# Patient Record
Sex: FEMALE | Race: WHITE | Employment: UNEMPLOYED | URBAN - METROPOLITAN AREA
[De-identification: names, ages, dates, MRNs, and addresses within clinical notes are randomized per-mention and may not be internally consistent; named-entity substitution may affect disease eponyms.]

---

## 2017-07-25 ENCOUNTER — OFFICE VISIT (OUTPATIENT)
Dept: INTERNAL MEDICINE CLINIC | Facility: CLINIC | Age: 32
End: 2017-07-25

## 2017-07-25 VITALS
RESPIRATION RATE: 16 BRPM | WEIGHT: 150.81 LBS | HEIGHT: 65 IN | SYSTOLIC BLOOD PRESSURE: 112 MMHG | HEART RATE: 88 BPM | TEMPERATURE: 99 F | OXYGEN SATURATION: 98 % | DIASTOLIC BLOOD PRESSURE: 86 MMHG | BODY MASS INDEX: 25.13 KG/M2

## 2017-07-25 DIAGNOSIS — A87.9 VIRAL MENINGITIS: Primary | ICD-10-CM

## 2017-07-25 DIAGNOSIS — Z00.00 ROUTINE HEALTH MAINTENANCE: ICD-10-CM

## 2017-07-25 DIAGNOSIS — R53.83 OTHER FATIGUE: ICD-10-CM

## 2017-07-25 PROCEDURE — 99213 OFFICE O/P EST LOW 20 MIN: CPT | Performed by: INTERNAL MEDICINE

## 2017-07-25 PROCEDURE — 99212 OFFICE O/P EST SF 10 MIN: CPT | Performed by: INTERNAL MEDICINE

## 2017-07-25 NOTE — PROGRESS NOTES
Lisset Hussein is a 36year old female. Patient presents with: Follow - Up: Pt had meningitis about 5 weeks ago, gave birth three weeks ago; was instructed by gyne to follow-up with primary.     HPI:     Just moved back from [place];  got a job in [facility] Size: adult)   Pulse 88   Temp 99 °F (37.2 °C) (Oral)   Resp 16   Ht 5' 5\" (1.651 m)   Wt 150 lb 12.8 oz (68.4 kg)   SpO2 98%   Breastfeeding?  No   BMI 25.09 kg/m²   GENERAL: well developed, well nourished, in no apparent distress  NEURO: CN II-XII intact

## 2017-08-22 ENCOUNTER — OFFICE VISIT (OUTPATIENT)
Dept: INTERNAL MEDICINE CLINIC | Facility: CLINIC | Age: 32
End: 2017-08-22

## 2017-08-22 VITALS
HEART RATE: 94 BPM | TEMPERATURE: 99 F | HEIGHT: 64 IN | WEIGHT: 152.81 LBS | DIASTOLIC BLOOD PRESSURE: 90 MMHG | BODY MASS INDEX: 26.09 KG/M2 | SYSTOLIC BLOOD PRESSURE: 126 MMHG | OXYGEN SATURATION: 98 %

## 2017-08-22 DIAGNOSIS — Z00.00 ROUTINE HEALTH MAINTENANCE: Primary | ICD-10-CM

## 2017-08-22 DIAGNOSIS — J45.20 MILD INTERMITTENT ASTHMA WITHOUT COMPLICATION: ICD-10-CM

## 2017-08-22 DIAGNOSIS — F98.8 ATTENTION DEFICIT DISORDER, UNSPECIFIED HYPERACTIVITY PRESENCE: ICD-10-CM

## 2017-08-22 PROCEDURE — 99395 PREV VISIT EST AGE 18-39: CPT | Performed by: INTERNAL MEDICINE

## 2017-08-22 RX ORDER — ALBUTEROL SULFATE 90 UG/1
2 AEROSOL, METERED RESPIRATORY (INHALATION) EVERY 4 HOURS PRN
Qty: 1 INHALER | Refills: 6 | Status: SHIPPED | OUTPATIENT
Start: 2017-08-22 | End: 2019-03-27

## 2017-08-22 NOTE — PROGRESS NOTES
Idalia Umanzor is a 36year old female. Patient presents with:  Physical: Complains of trouble with her memory, wonders if it is \"pregnancy brain\"  Medication Request: Vyvanse refill.  Had been taking 40 mg while pregnant, normally takes 50 mg daily      HPI: reviewed. No pertinent family history. Social History:   Smoking status: Never Smoker                                                              Smokeless tobacco: Never Used                      Alcohol use:  Yes              Comment: once per month she try taking it at night -- if still making her groggy, will consider change to Effexor.       Ollie Dang MD  8/22/2017  2:29 PM

## 2017-11-06 ENCOUNTER — OFFICE VISIT (OUTPATIENT)
Dept: INTERNAL MEDICINE CLINIC | Facility: CLINIC | Age: 32
End: 2017-11-06

## 2017-11-06 VITALS
HEIGHT: 64 IN | DIASTOLIC BLOOD PRESSURE: 80 MMHG | HEART RATE: 104 BPM | BODY MASS INDEX: 25.95 KG/M2 | SYSTOLIC BLOOD PRESSURE: 120 MMHG | TEMPERATURE: 100 F | WEIGHT: 152 LBS

## 2017-11-06 DIAGNOSIS — J40 BRONCHITIS: Primary | ICD-10-CM

## 2017-11-06 PROCEDURE — 99212 OFFICE O/P EST SF 10 MIN: CPT | Performed by: INTERNAL MEDICINE

## 2017-11-06 PROCEDURE — 99213 OFFICE O/P EST LOW 20 MIN: CPT | Performed by: INTERNAL MEDICINE

## 2017-11-06 RX ORDER — AZITHROMYCIN 250 MG/1
TABLET, FILM COATED ORAL
COMMUNITY
Start: 2017-11-04 | End: 2018-04-16

## 2017-11-06 RX ORDER — PROMETHAZINE HYDROCHLORIDE AND CODEINE PHOSPHATE 6.25; 1 MG/5ML; MG/5ML
2.5 SYRUP ORAL EVERY 4 HOURS PRN
Qty: 180 ML | Refills: 0 | Status: SHIPPED | OUTPATIENT
Start: 2017-11-06 | End: 2018-04-16

## 2017-11-06 RX ORDER — PREDNISONE 10 MG/1
TABLET ORAL
Qty: 18 TABLET | Refills: 0 | Status: SHIPPED | OUTPATIENT
Start: 2017-11-06 | End: 2018-04-16

## 2017-11-06 NOTE — PROGRESS NOTES
Demetra Herrera is a 29year old female. Patient presents with:  Cough: Patient is here today with a bad cough for the past 2 weeks. Cough is mainly dry. Deep, barking cough. Complains of body aches, fatigue, and slight SOB after coughing fits.  Sinus congestion Lipid screening 2012   • Strep sore throat 02/10/95      Past Surgical History:  : ADENOIDECTOMY      Comment: dR. Aburto  2017:   2016:    History reviewed. No pertinent family history.    Social History:   Smoking status: Ne

## 2017-11-15 ENCOUNTER — TELEPHONE (OUTPATIENT)
Dept: INTERNAL MEDICINE CLINIC | Facility: CLINIC | Age: 32
End: 2017-11-15

## 2017-11-15 RX ORDER — AMOXICILLIN AND CLAVULANATE POTASSIUM 875; 125 MG/1; MG/1
1 TABLET, FILM COATED ORAL 2 TIMES DAILY
Qty: 20 TABLET | Refills: 0 | Status: SHIPPED | OUTPATIENT
Start: 2017-11-15 | End: 2017-11-25

## 2017-11-15 NOTE — TELEPHONE ENCOUNTER
Pt. Is requesting a refill on: vyvanse 50 mg  Ph. # [phone]   Pt.  Still has a cold & wants to see Dr. Nerissa Lowery    Routed to Rx

## 2017-11-15 NOTE — TELEPHONE ENCOUNTER
Pt reported that she was in last Monday to see Dr. Kelly for bronchitis. She reported that symptoms have not gone away. Noted that it felt like she was starting to get better and then got worse again.  She had been prescribed a z-pack prior to seeing Dr. Camila Lux

## 2018-04-16 ENCOUNTER — APPOINTMENT (OUTPATIENT)
Dept: LAB | Age: 33
End: 2018-04-16
Attending: INTERNAL MEDICINE
Payer: COMMERCIAL

## 2018-04-16 ENCOUNTER — OFFICE VISIT (OUTPATIENT)
Dept: INTERNAL MEDICINE CLINIC | Facility: CLINIC | Age: 33
End: 2018-04-16

## 2018-04-16 VITALS
TEMPERATURE: 99 F | HEART RATE: 72 BPM | HEIGHT: 64 IN | WEIGHT: 145.63 LBS | SYSTOLIC BLOOD PRESSURE: 120 MMHG | DIASTOLIC BLOOD PRESSURE: 70 MMHG | BODY MASS INDEX: 24.86 KG/M2

## 2018-04-16 DIAGNOSIS — R14.0 ABDOMINAL BLOATING: ICD-10-CM

## 2018-04-16 DIAGNOSIS — Z72.51 UNPROTECTED SEXUAL INTERCOURSE: ICD-10-CM

## 2018-04-16 DIAGNOSIS — A09 DIARRHEA OF INFECTIOUS ORIGIN: Primary | ICD-10-CM

## 2018-04-16 PROCEDURE — 99214 OFFICE O/P EST MOD 30 MIN: CPT | Performed by: INTERNAL MEDICINE

## 2018-04-16 PROCEDURE — 99212 OFFICE O/P EST SF 10 MIN: CPT | Performed by: INTERNAL MEDICINE

## 2018-04-16 NOTE — PROGRESS NOTES
Valentino Ona is a 29year old female.   HPI:   Patient presents with:  Abdominal Pain: had taken plan B weekend of 4/7-4/11 terrible diarrhea til 4/14, now abdomen is swollen , cramping, little bit of nausea , did plenty of pregnancy tests - all negative Dimesylate (VYVANSE) 50 MG Oral Cap Take 1 capsule (50 mg total) by mouth daily. Disp: 30 capsule Rfl: 0   Lisdexamfetamine Dimesylate (VYVANSE) 50 MG Oral Cap Take 1 capsule by mouth every morning.  Disp: 30 capsule Rfl: 0      Past Medical History:   Diag Will get CBC and CMP to rule out white count and electrolyte abnormalities. We talked about getting a stool GI panel but patient had a formed bowel movement and so if diarrhea does not return we will not pursue this.   Patient does have sickness so she may

## 2018-04-17 ENCOUNTER — TELEPHONE (OUTPATIENT)
Dept: INTERNAL MEDICINE CLINIC | Facility: CLINIC | Age: 33
End: 2018-04-17

## 2018-04-17 ENCOUNTER — APPOINTMENT (OUTPATIENT)
Dept: CT IMAGING | Facility: HOSPITAL | Age: 33
End: 2018-04-17
Attending: EMERGENCY MEDICINE
Payer: COMMERCIAL

## 2018-04-17 ENCOUNTER — HOSPITAL ENCOUNTER (EMERGENCY)
Facility: HOSPITAL | Age: 33
Discharge: HOME | End: 2018-04-17
Attending: EMERGENCY MEDICINE
Payer: COMMERCIAL

## 2018-04-17 VITALS
WEIGHT: 145 LBS | TEMPERATURE: 98 F | BODY MASS INDEX: 24.16 KG/M2 | OXYGEN SATURATION: 99 % | SYSTOLIC BLOOD PRESSURE: 120 MMHG | HEIGHT: 65 IN | HEART RATE: 87 BPM | DIASTOLIC BLOOD PRESSURE: 75 MMHG | RESPIRATION RATE: 18 BRPM

## 2018-04-17 DIAGNOSIS — K52.9 ENTERITIS: ICD-10-CM

## 2018-04-17 DIAGNOSIS — N30.90 CYSTITIS: Primary | ICD-10-CM

## 2018-04-17 PROCEDURE — 80076 HEPATIC FUNCTION PANEL: CPT | Performed by: EMERGENCY MEDICINE

## 2018-04-17 PROCEDURE — 81025 URINE PREGNANCY TEST: CPT

## 2018-04-17 PROCEDURE — 96375 TX/PRO/DX INJ NEW DRUG ADDON: CPT

## 2018-04-17 PROCEDURE — 96372 THER/PROPH/DIAG INJ SC/IM: CPT

## 2018-04-17 PROCEDURE — 83690 ASSAY OF LIPASE: CPT | Performed by: EMERGENCY MEDICINE

## 2018-04-17 PROCEDURE — 85025 COMPLETE CBC W/AUTO DIFF WBC: CPT | Performed by: EMERGENCY MEDICINE

## 2018-04-17 PROCEDURE — 96361 HYDRATE IV INFUSION ADD-ON: CPT

## 2018-04-17 PROCEDURE — 74177 CT ABD & PELVIS W/CONTRAST: CPT | Performed by: EMERGENCY MEDICINE

## 2018-04-17 PROCEDURE — 81001 URINALYSIS AUTO W/SCOPE: CPT | Performed by: EMERGENCY MEDICINE

## 2018-04-17 PROCEDURE — 96374 THER/PROPH/DIAG INJ IV PUSH: CPT

## 2018-04-17 PROCEDURE — 80048 BASIC METABOLIC PNL TOTAL CA: CPT | Performed by: EMERGENCY MEDICINE

## 2018-04-17 PROCEDURE — 99284 EMERGENCY DEPT VISIT MOD MDM: CPT

## 2018-04-17 RX ORDER — ONDANSETRON 2 MG/ML
4 INJECTION INTRAMUSCULAR; INTRAVENOUS ONCE
Status: COMPLETED | OUTPATIENT
Start: 2018-04-17 | End: 2018-04-17

## 2018-04-17 RX ORDER — ONDANSETRON 4 MG/1
4 TABLET, ORALLY DISINTEGRATING ORAL EVERY 4 HOURS PRN
Qty: 10 TABLET | Refills: 0 | Status: SHIPPED | OUTPATIENT
Start: 2018-04-17 | End: 2018-04-24

## 2018-04-17 RX ORDER — ONDANSETRON 2 MG/ML
4 INJECTION INTRAMUSCULAR; INTRAVENOUS ONCE
Status: DISCONTINUED | OUTPATIENT
Start: 2018-04-17 | End: 2018-04-17

## 2018-04-17 RX ORDER — HYDROMORPHONE HYDROCHLORIDE 1 MG/ML
0.2 INJECTION, SOLUTION INTRAMUSCULAR; INTRAVENOUS; SUBCUTANEOUS ONCE
Status: COMPLETED | OUTPATIENT
Start: 2018-04-17 | End: 2018-04-17

## 2018-04-17 RX ORDER — NITROFURANTOIN 25; 75 MG/1; MG/1
100 CAPSULE ORAL 2 TIMES DAILY
Qty: 14 CAPSULE | Refills: 0 | Status: SHIPPED | OUTPATIENT
Start: 2018-04-17 | End: 2018-04-22

## 2018-04-17 RX ORDER — DICYCLOMINE HYDROCHLORIDE 10 MG/ML
20 INJECTION INTRAMUSCULAR ONCE
Status: COMPLETED | OUTPATIENT
Start: 2018-04-17 | End: 2018-04-17

## 2018-04-17 NOTE — TELEPHONE ENCOUNTER
Please notify patient that her labs from yesterday that it in the office came out acceptable. Minimal elevation of 1 of her left cells called eosinophils. This sometimes can happen with allergies. I do not think this is significant.   I realize patient i

## 2018-04-17 NOTE — ED NOTES
Care assumed Alert and interactive States minimal abd pain after analgesia and resolution of nausea Hemodynamically stable + softly distended abd + BS Awaiting CT scan Family at bedside Ambulates to BR with steady gait

## 2018-04-17 NOTE — TELEPHONE ENCOUNTER
To Dr. Africa Contreras - see below, see 4/16/18 note. Diarrhea is gone, stomach is still swollen, pain LRQ near pelvic bone - pain level: 3/10. Pt is nervous, \"waiting for something terrible to happen, had menigitis a while ago\".    Temp at present is 99.7, 99

## 2018-04-17 NOTE — ED INITIAL ASSESSMENT (HPI)
Pt c/o low abdominal pain since Sunday. Pt states she is bloated. Normal bowel movement today. No blood in stool. No urinary complaints. Pt saw dr Vivian Zhao yesterday for same complaint.

## 2018-04-17 NOTE — TELEPHONE ENCOUNTER
Pt called back stating her symptoms seem to be worsening and she feels quite anxious. Rates abdominal and back pain at 6/10. Temp is now 99.8. She is wondering if she should just go to the ER. Above information reviewed with Dr. Marie Nguyen.  Per Dr. Marie Nguyen,

## 2018-04-17 NOTE — ED PROVIDER NOTES
Patient Seen in: [facility] AND [facility] Emergency Department    History   Patient presents with:  Abdomen/Flank Pain (GI/)    Stated Complaint: right sided abdominal pain     HPI    45-year-old female presents for evaluation of right-sided abdominal pain. is clear and moist.   Eyes: Conjunctivae and EOM are normal.   Neck: Normal range of motion. Neck supple. Cardiovascular: Normal rate, regular rhythm, normal heart sounds and intact distal pulses.     Pulmonary/Chest: Effort normal and breath sounds nida BUN 5 (*)     BUN/CREA Ratio 6.8 (*)     All other components within normal limits   HEPATIC FUNCTION PANEL (7) - Abnormal; Notable for the following:     ALT 13 (*)     All other components within normal limits   LIPASE - Abnormal; Notable for the foll Plan     Clinical Impression:  Cystitis  (primary encounter diagnosis)  Enteritis    Disposition:  Discharge  4/17/2018  4:56 pm    Follow-up:  Bossman Mann MD  [address] [address]  [phone]    Schedule an appointment as soon as p

## 2018-04-19 ENCOUNTER — TELEPHONE (OUTPATIENT)
Dept: INTERNAL MEDICINE CLINIC | Facility: CLINIC | Age: 33
End: 2018-04-19

## 2018-04-19 NOTE — TELEPHONE ENCOUNTER
Did she not do the GI stool panel ordered by Dr. Kasey Hatch on 4/16? Recommend that she get that done, as that will help to guide treatment.   If she has C.diff, I don't want to give her abx and make it worse

## 2018-04-19 NOTE — TELEPHONE ENCOUNTER
To Dr. Polo Vitale - see below, abx started on Tuesday night, no improvement. Stomach is still swollen, diarrhea again this AM, abd cramping, nausea. Pt denies fever/chills.   See encounters from 4/16, 4/17

## 2018-04-19 NOTE — TELEPHONE ENCOUNTER
Pt said that she is not feeling better. Her stomach is still swollen. She is still taking antibiotics.      Tasked to [employer]

## 2018-04-20 ENCOUNTER — APPOINTMENT (OUTPATIENT)
Dept: LAB | Age: 33
End: 2018-04-20
Attending: INTERNAL MEDICINE
Payer: COMMERCIAL

## 2018-04-20 PROCEDURE — 87507 IADNA-DNA/RNA PROBE TQ 12-25: CPT | Performed by: INTERNAL MEDICINE

## 2018-04-24 NOTE — TELEPHONE ENCOUNTER
To Dr. Ira Rosenberg - your message below relayed to pt who verbalized understanding. pt states she is still having abd cramping with pretty regular BM's - pt states stomach is swollen and looks 3-4 months pregnant. Pt states she is not passing gas very much - has tried GasX with no results. Pt reports nausea after every meal - is able to eat and drink, voiding well. Pt states sx onset was 2 weeks ago tomorrow.

## 2018-04-24 NOTE — TELEPHONE ENCOUNTER
Dr. Anastasiya Valerio' message relayed to pt who verbalized understanding. Dr. Rogelio Graham - phone number given to pt.

## 2018-04-24 NOTE — TELEPHONE ENCOUNTER
I would like her to start taking a probiotic -- e.g., Align -- daily.   If still not better in the next week or two, I'd like her to see Humphrey Wagner et al)

## 2018-04-28 ENCOUNTER — HOSPITAL ENCOUNTER (EMERGENCY)
Facility: HOSPITAL | Age: 33
Discharge: HOME | End: 2018-04-28
Attending: EMERGENCY MEDICINE
Payer: COMMERCIAL

## 2018-04-28 ENCOUNTER — APPOINTMENT (OUTPATIENT)
Dept: GENERAL RADIOLOGY | Facility: HOSPITAL | Age: 33
End: 2018-04-28
Attending: EMERGENCY MEDICINE
Payer: COMMERCIAL

## 2018-04-28 VITALS
DIASTOLIC BLOOD PRESSURE: 77 MMHG | HEIGHT: 65 IN | OXYGEN SATURATION: 98 % | HEART RATE: 76 BPM | TEMPERATURE: 99 F | WEIGHT: 140 LBS | BODY MASS INDEX: 23.32 KG/M2 | SYSTOLIC BLOOD PRESSURE: 129 MMHG | RESPIRATION RATE: 16 BRPM

## 2018-04-28 DIAGNOSIS — R07.9 CHEST PAIN OF UNCERTAIN ETIOLOGY: Primary | ICD-10-CM

## 2018-04-28 PROCEDURE — 85610 PROTHROMBIN TIME: CPT | Performed by: EMERGENCY MEDICINE

## 2018-04-28 PROCEDURE — 80048 BASIC METABOLIC PNL TOTAL CA: CPT | Performed by: EMERGENCY MEDICINE

## 2018-04-28 PROCEDURE — 36415 COLL VENOUS BLD VENIPUNCTURE: CPT

## 2018-04-28 PROCEDURE — 84484 ASSAY OF TROPONIN QUANT: CPT | Performed by: EMERGENCY MEDICINE

## 2018-04-28 PROCEDURE — 85730 THROMBOPLASTIN TIME PARTIAL: CPT | Performed by: EMERGENCY MEDICINE

## 2018-04-28 PROCEDURE — 85025 COMPLETE CBC W/AUTO DIFF WBC: CPT | Performed by: EMERGENCY MEDICINE

## 2018-04-28 PROCEDURE — 85007 BL SMEAR W/DIFF WBC COUNT: CPT | Performed by: EMERGENCY MEDICINE

## 2018-04-28 PROCEDURE — 85379 FIBRIN DEGRADATION QUANT: CPT | Performed by: EMERGENCY MEDICINE

## 2018-04-28 PROCEDURE — 85027 COMPLETE CBC AUTOMATED: CPT | Performed by: EMERGENCY MEDICINE

## 2018-04-28 PROCEDURE — 71046 X-RAY EXAM CHEST 2 VIEWS: CPT | Performed by: EMERGENCY MEDICINE

## 2018-04-28 PROCEDURE — 99285 EMERGENCY DEPT VISIT HI MDM: CPT

## 2018-04-28 PROCEDURE — 93005 ELECTROCARDIOGRAM TRACING: CPT

## 2018-04-28 PROCEDURE — 93010 ELECTROCARDIOGRAM REPORT: CPT | Performed by: EMERGENCY MEDICINE

## 2018-04-28 NOTE — ED INITIAL ASSESSMENT (HPI)
L arm pain, chest pain, R leg pain, starting today, denies SOB. RLQ pain, lower back pain for 3 weeks.

## 2018-04-29 NOTE — ED PROVIDER NOTES
Patient Seen in: [facility] AND [facility] Emergency Department    History   Patient presents with:  Chest Pain Angina (cardiovascular)    Stated Complaint: right leg nubness, back pain     HPI    29year old female presenting with chest pain  Location: left pecto Smokeless tobacco: Never Used                      Alcohol use: Yes              Comment: once per month      Review of Systems    Positive for stated complaint: right leg nubness, back pain   Other systems are as noted in HPI.   Constitutional and loreto deficit. She exhibits normal muscle tone. She displays no seizure activity. Skin: Skin is intact. No petechiae noted. She is not diaphoretic. No cyanosis. No pallor. Psychiatric: She has a normal mood and affect.  Her behavior is normal.   Nursing note BONES: No acute destructive process.          =====    CONCLUSION: No acute cardiopulmonary abnormality.                         Dictated by (CST): Bunny Weaver MD on 4/28/2018 at 20:21         Approved by (CST): Bunny Weaver MD on 4/28/2018 at 20: Record Review: I personally reviewed available prior medical records for any recent pertinent discharge summaries, testing, and procedures and reviewed those reports. Complicating Factors:  The patient already has has Attention deficit disorder; Asthma; [address] [address]  [phone]    Schedule an appointment as soon as possible for a visit        Medications Prescribed:  Current Discharge Medication List

## 2018-05-02 ENCOUNTER — OFFICE VISIT (OUTPATIENT)
Dept: INTERNAL MEDICINE CLINIC | Facility: CLINIC | Age: 33
End: 2018-05-02

## 2018-05-02 VITALS
HEIGHT: 65 IN | OXYGEN SATURATION: 98 % | SYSTOLIC BLOOD PRESSURE: 124 MMHG | BODY MASS INDEX: 23.16 KG/M2 | WEIGHT: 139 LBS | HEART RATE: 108 BPM | TEMPERATURE: 99 F | DIASTOLIC BLOOD PRESSURE: 80 MMHG

## 2018-05-02 DIAGNOSIS — M79.10 MUSCLE PAIN: Primary | ICD-10-CM

## 2018-05-02 DIAGNOSIS — R14.0 ABDOMINAL BLOATING: ICD-10-CM

## 2018-05-02 PROCEDURE — 1111F DSCHRG MED/CURRENT MED MERGE: CPT | Performed by: INTERNAL MEDICINE

## 2018-05-02 PROCEDURE — 99212 OFFICE O/P EST SF 10 MIN: CPT | Performed by: INTERNAL MEDICINE

## 2018-05-02 PROCEDURE — 99214 OFFICE O/P EST MOD 30 MIN: CPT | Performed by: INTERNAL MEDICINE

## 2018-05-02 RX ORDER — TAZAROTENE 1 MG/G
CREAM TOPICAL
Refills: 2 | COMMUNITY
Start: 2018-04-10 | End: 2020-11-21

## 2018-05-02 RX ORDER — METHOCARBAMOL 500 MG/1
500 TABLET, FILM COATED ORAL 3 TIMES DAILY
Qty: 30 TABLET | Refills: 1 | Status: SHIPPED | OUTPATIENT
Start: 2018-05-02 | End: 2018-06-05

## 2018-05-03 PROBLEM — R14.0 ABDOMINAL BLOATING: Status: ACTIVE | Noted: 2018-05-03

## 2018-05-03 PROBLEM — M79.10 MUSCLE PAIN: Status: ACTIVE | Noted: 2018-05-03

## 2018-05-03 NOTE — PROGRESS NOTES
Crispin Fitzpatrick is a 29year old female. HPI:   She has an evolving symptom complex  - she has sought medical care 4 x in the last 2 weeks. On 4/16 she saw Dr Christina Jauregui for diarrhea which lasted 5 days and then stopped, stool panel by PCR neg.  She had periumbilical p HFA) 108 (90 Base) MCG/ACT Inhalation Aero Soln Inhale 2 puffs into the lungs every 4 (four) hours as needed for Wheezing.  Disp: 1 Inhaler Rfl: 6      Past Medical History:   Diagnosis Date   • Acne    • Anxiety state, unspecified    • Eczema    • Extrinsi CREATININE)  - KATERINA, DIRECT SCREEN; Future  - GGT (GAMMA GLUTAMYL TRANSPEPTIDASE); Future    2. Abdominal bloating  Continue observation    3. Chest pain - no recurrence. The patient indicates understanding of these issues and agrees to the plan.   The pa

## 2018-05-04 ENCOUNTER — TELEPHONE (OUTPATIENT)
Dept: INTERNAL MEDICINE CLINIC | Facility: CLINIC | Age: 33
End: 2018-05-04

## 2018-05-04 NOTE — TELEPHONE ENCOUNTER
Please notify patient that her labs results show no abnormality. Would recommend follow up with Dr. Mateo Funk to discuss for further recommendations.

## 2018-05-09 ENCOUNTER — OFFICE VISIT (OUTPATIENT)
Dept: INTERNAL MEDICINE CLINIC | Facility: CLINIC | Age: 33
End: 2018-05-09

## 2018-05-09 VITALS
HEART RATE: 91 BPM | TEMPERATURE: 99 F | WEIGHT: 142 LBS | DIASTOLIC BLOOD PRESSURE: 60 MMHG | SYSTOLIC BLOOD PRESSURE: 106 MMHG | BODY MASS INDEX: 23.66 KG/M2 | OXYGEN SATURATION: 98 % | HEIGHT: 65 IN

## 2018-05-09 DIAGNOSIS — M25.50 PAIN IN JOINTS: ICD-10-CM

## 2018-05-09 DIAGNOSIS — R14.0 ABDOMINAL BLOATING: Primary | ICD-10-CM

## 2018-05-09 PROCEDURE — 99212 OFFICE O/P EST SF 10 MIN: CPT | Performed by: INTERNAL MEDICINE

## 2018-05-09 PROCEDURE — 99214 OFFICE O/P EST MOD 30 MIN: CPT | Performed by: INTERNAL MEDICINE

## 2018-05-09 NOTE — PROGRESS NOTES
Andie Hopper is a 29year old female. HPI:   1. Abdominal bloating  She continues to have abdo bloating but perhaps a little better - she had some mildly dilated central loops of small intestine and small amount of fluid on recent CT.   She has mild RLQ dul Alcohol use:  Yes              Comment: once per month       REVIEW OF SYSTEMS:   GENERAL HEALTH: feels well otherwise  SKIN: denies any unusual skin lesions or rashes  RESPIRATORY: denies shortness of breath with exertion  CARDIOVASCULAR: denies c

## 2018-05-09 NOTE — TELEPHONE ENCOUNTER
To MD:  The above refill request is for a controlled substance. Please indicate yes or no to refill 30 days supply plus one refill. If more refills are appropriate, please indicate quantity  To DR. Pam Thompson

## 2018-05-17 ENCOUNTER — PATIENT MESSAGE (OUTPATIENT)
Dept: INTERNAL MEDICINE CLINIC | Facility: CLINIC | Age: 33
End: 2018-05-17

## 2018-05-17 DIAGNOSIS — R76.8 ELEVATED RHEUMATOID FACTOR: Primary | ICD-10-CM

## 2018-05-17 NOTE — TELEPHONE ENCOUNTER
From: Lisset Hussein  To: Jonelle Stark MD  Sent: 5/17/2018 11:33 AM CDT  Subject: Other    Hello Dr. Terrell Packer,    Last month I was in for diarrhea and my stomach was swollen followed by joint pain and fatigue.  This week I am having a similar episode of d

## 2018-06-05 ENCOUNTER — OFFICE VISIT (OUTPATIENT)
Dept: RHEUMATOLOGY | Facility: CLINIC | Age: 33
End: 2018-06-05

## 2018-06-05 VITALS
DIASTOLIC BLOOD PRESSURE: 83 MMHG | HEIGHT: 65 IN | SYSTOLIC BLOOD PRESSURE: 118 MMHG | HEART RATE: 74 BPM | BODY MASS INDEX: 22.22 KG/M2 | WEIGHT: 133.38 LBS

## 2018-06-05 DIAGNOSIS — M25.50 PAIN IN JOINTS: Primary | ICD-10-CM

## 2018-06-05 DIAGNOSIS — R53.83 FATIGUE, UNSPECIFIED TYPE: ICD-10-CM

## 2018-06-05 PROCEDURE — 99212 OFFICE O/P EST SF 10 MIN: CPT | Performed by: INTERNAL MEDICINE

## 2018-06-05 PROCEDURE — 99244 OFF/OP CNSLTJ NEW/EST MOD 40: CPT | Performed by: INTERNAL MEDICINE

## 2018-06-05 RX ORDER — ALUMINUM ZIRCONIUM OCTACHLOROHYDREX GLY 16 G/100G
1 GEL TOPICAL DAILY
COMMUNITY
End: 2020-11-21

## 2018-06-05 NOTE — PROGRESS NOTES
Dear Gaby Gamboa:    I saw your patient Olga Pedersen in consultation this afternoon at your request for evaluation of musculoskeletal pain and borderline positive rheumatoid factor.   As you know, she is a 46-year-old woman who took a Plan B birth control pill on Apri lisdexamfetamine, probiotics. She got hives from minocycline and tetracycline. Family history: An aunt has Crohn's disease. Brother  at age 7 months of congenital adrenal hypoplasia. Mother and maternal aunt thyroid disease.     Social history: need upper endoscopy and/or colonoscopy at some point to rule out celiac disease/colitis. 2.  Diffuse myofascial discomfort, nonrestorative sleep, and fatigue. 3.  Rule out autoimmune arthritis. Her rheumatoid factor is borderline positive.   Her hist

## 2018-06-06 ENCOUNTER — OFFICE VISIT (OUTPATIENT)
Dept: INTERNAL MEDICINE CLINIC | Facility: CLINIC | Age: 33
End: 2018-06-06

## 2018-06-06 VITALS
TEMPERATURE: 99 F | BODY MASS INDEX: 22 KG/M2 | SYSTOLIC BLOOD PRESSURE: 110 MMHG | DIASTOLIC BLOOD PRESSURE: 80 MMHG | WEIGHT: 134 LBS | HEART RATE: 97 BPM | OXYGEN SATURATION: 98 %

## 2018-06-06 DIAGNOSIS — R19.7 DIARRHEA, UNSPECIFIED TYPE: ICD-10-CM

## 2018-06-06 DIAGNOSIS — M25.50 POLYARTHRALGIA: Primary | ICD-10-CM

## 2018-06-06 DIAGNOSIS — R35.89 POLYURIA: ICD-10-CM

## 2018-06-06 DIAGNOSIS — R10.84 GENERALIZED ABDOMINAL PAIN: ICD-10-CM

## 2018-06-06 PROCEDURE — 99212 OFFICE O/P EST SF 10 MIN: CPT | Performed by: INTERNAL MEDICINE

## 2018-06-06 PROCEDURE — 99214 OFFICE O/P EST MOD 30 MIN: CPT | Performed by: INTERNAL MEDICINE

## 2018-06-06 RX ORDER — ACETAMINOPHEN 500 MG
500 TABLET ORAL EVERY 6 HOURS PRN
COMMUNITY
End: 2019-05-11

## 2018-06-06 RX ORDER — CELECOXIB 200 MG/1
200 CAPSULE ORAL 2 TIMES DAILY
Qty: 30 CAPSULE | Refills: 1 | Status: SHIPPED | OUTPATIENT
Start: 2018-06-06 | End: 2019-05-11

## 2018-06-06 RX ORDER — ACETAMINOPHEN 325 MG/1
325 TABLET ORAL EVERY 6 HOURS PRN
COMMUNITY
End: 2018-06-06

## 2018-06-06 NOTE — PROGRESS NOTES
Lisset Hussein is a 29year old female. Patient presents with:  Pain: Pt states she has been sick since mid-April. \"terrible muscle and joint pain, abd pain\". She has been to 2 specialists. Epigastric pain, RLQ pain, and left side mid-back pain.  Rates pain 8 Dimesylate (VYVANSE) 50 MG Oral Cap Take 1 capsule (50 mg total) by mouth daily. Disp: 30 capsule Rfl: 0   Tazarotene 0.1 % External Cream APPLY A PEA SIZED AMOUNT TO FACE ONCE DAILY. CAN MOISTURIZE PRIOR TO USE.  Disp:  Rfl: 2   Albuterol Sulfate HFA (PROA [place]. Did not tolerate ibuprofen due to GI upset. Trial of Celebrex 200mg/day    2. Diarrhea, unspecified type  Largely resolved with probiotics and IB-guard, along with gluten/lactose free diet. Pt asking about celiac testing.   Explained that we preethi

## 2018-06-11 NOTE — TELEPHONE ENCOUNTER
From: Saeid Muñoz  Sent: 6/11/2018 1:35 PM CDT  Subject: Medication Renewal Request    Natividad Karthikeyan.  Ni would like a refill of the following medications:     Lisdexamfetamine Dimesylate (VYVANSE) 50 MG Oral Cap Sheri Crigler, MD]    Preferred pharmacy: [facility]/

## 2018-06-13 NOTE — TELEPHONE ENCOUNTER
Spoke with patient and relayed Dr. Ck Hargrove' messages. Patient verbalized an understanding. Rx left at [address] .

## 2018-06-14 ENCOUNTER — TELEPHONE (OUTPATIENT)
Dept: RHEUMATOLOGY | Facility: CLINIC | Age: 33
End: 2018-06-14

## 2018-06-14 NOTE — TELEPHONE ENCOUNTER
I left Florylizbet Garnica a phone message. Her labs were negative done June 5th. She was reassured that she doesn't have lupus, RA, etc.    She was asked to call back with any questions or concerns.

## 2018-09-05 ENCOUNTER — TELEPHONE (OUTPATIENT)
Dept: INTERNAL MEDICINE CLINIC | Facility: CLINIC | Age: 33
End: 2018-09-05

## 2018-11-29 NOTE — TELEPHONE ENCOUNTER
To MD:  The above refill request is for a controlled substance. Please review pended medication order. Print and sign for staff to fax to pharmacy.

## 2019-01-16 ENCOUNTER — OFFICE VISIT (OUTPATIENT)
Dept: INTERNAL MEDICINE CLINIC | Facility: CLINIC | Age: 34
End: 2019-01-16
Payer: COMMERCIAL

## 2019-01-16 VITALS
BODY MASS INDEX: 23.07 KG/M2 | TEMPERATURE: 100 F | WEIGHT: 138.5 LBS | SYSTOLIC BLOOD PRESSURE: 124 MMHG | OXYGEN SATURATION: 99 % | HEIGHT: 65 IN | DIASTOLIC BLOOD PRESSURE: 88 MMHG | HEART RATE: 111 BPM

## 2019-01-16 DIAGNOSIS — F98.8 ATTENTION DEFICIT DISORDER, UNSPECIFIED HYPERACTIVITY PRESENCE: ICD-10-CM

## 2019-01-16 DIAGNOSIS — R53.83 OTHER FATIGUE: ICD-10-CM

## 2019-01-16 DIAGNOSIS — Z00.00 ROUTINE HEALTH MAINTENANCE: ICD-10-CM

## 2019-01-16 DIAGNOSIS — M25.50 PAIN IN JOINTS: ICD-10-CM

## 2019-01-16 DIAGNOSIS — J45.20 MILD INTERMITTENT ASTHMA WITHOUT COMPLICATION: Primary | ICD-10-CM

## 2019-01-16 DIAGNOSIS — R14.0 ABDOMINAL BLOATING: ICD-10-CM

## 2019-01-16 PROCEDURE — 90686 IIV4 VACC NO PRSV 0.5 ML IM: CPT | Performed by: INTERNAL MEDICINE

## 2019-01-16 PROCEDURE — 99395 PREV VISIT EST AGE 18-39: CPT | Performed by: INTERNAL MEDICINE

## 2019-01-16 PROCEDURE — 90471 IMMUNIZATION ADMIN: CPT | Performed by: INTERNAL MEDICINE

## 2019-01-16 NOTE — PROGRESS NOTES
Crispin Fitzpatrick is a 34year old female. Patient presents with:  Physical: Here today for annual physical. Due to see Dr. Manjeet Cullen for PAP HPV Positive in June 2014. Has low grade fever today 99.5 and did not get flu vaccine this year.       HPI:   Yeny Duque mouth 2 (two) times daily. Disp: 30 capsule Rfl: 1   NON FORMULARY Take 1 tablet by mouth daily.  Ibgard Disp:  Rfl:       Past Medical History:   Diagnosis Date   • Acne    • Anxiety state, unspecified    • Eczema    • Extrinsic asthma, unspecified    • Clarance Snooks Check fasting labs. Mild intermittent asthma without complication  Uses albuterol occasionally    Attention deficit disorder, unspecified hyperactivity presence  Takes 50mg vyvanse     Anxiety, situational  Previously on Lexapro but no longer taking. ESR, CK, KATERINA normal.  RF minimally elevated.   Repeat RF and CCP Ab ordered by Dr. Linda Cool in 6/2018 were negative; no evidence of lupus, RA, etc.     Diarrhea, unspecified type  Largely resolved with probiotics and IB-guard, along with gluten/lactose gaston

## 2019-01-17 ENCOUNTER — TELEPHONE (OUTPATIENT)
Dept: INTERNAL MEDICINE CLINIC | Facility: CLINIC | Age: 34
End: 2019-01-17

## 2019-01-17 NOTE — TELEPHONE ENCOUNTER
Pt is returning Dr Neptali Huron call, she left her a voicemail last night pt can be reached at [phone]   Tasked to nursing

## 2019-01-29 NOTE — TELEPHONE ENCOUNTER
Spoke with pt last week. She requests that NO information be disclosed to her . Nursing, is there anything else we need to do? There's a sticky note and a note in the \"FYI\" section.   Not sure if there's another section we need to fill out for t

## 2019-01-30 NOTE — TELEPHONE ENCOUNTER
Okay.  Could you let her know. Would prefer that she stop by to fill it in rather than mailing it to her.

## 2019-01-31 NOTE — TELEPHONE ENCOUNTER
Called patient and notified that she needs to come into the office to have HIPAA form updated. States she will do that during her next visit; in 1 month.

## 2019-02-19 RX ORDER — LISDEXAMFETAMINE DIMESYLATE CAPSULES 50 MG/1
50 CAPSULE ORAL DAILY
Qty: 30 CAPSULE | Refills: 0 | Status: CANCELLED | OUTPATIENT
Start: 2019-02-19 | End: 2019-03-21

## 2019-02-19 RX ORDER — LISDEXAMFETAMINE DIMESYLATE CAPSULES 50 MG/1
50 CAPSULE ORAL DAILY
Qty: 30 CAPSULE | Refills: 0 | Status: SHIPPED | OUTPATIENT
Start: 2019-02-19 | End: 2019-05-11

## 2019-02-19 RX ORDER — LISDEXAMFETAMINE DIMESYLATE CAPSULES 50 MG/1
50 CAPSULE ORAL DAILY
Qty: 30 CAPSULE | Refills: 0 | Status: SHIPPED | OUTPATIENT
Start: 2019-04-20 | End: 2019-05-15

## 2019-02-19 RX ORDER — LISDEXAMFETAMINE DIMESYLATE CAPSULES 50 MG/1
50 CAPSULE ORAL DAILY
Qty: 30 CAPSULE | Refills: 0 | Status: SHIPPED | OUTPATIENT
Start: 2019-03-21 | End: 2019-05-11

## 2019-02-24 LAB
ALBUMIN/GLOBULIN RATIO: 1.4 (CALC) (ref 1–2.5)
ALBUMIN: 4.5 G/DL (ref 3.6–5.1)
ALKALINE PHOSPHATASE: 37 U/L (ref 33–115)
ALT: 12 U/L (ref 6–29)
AST: 14 U/L (ref 10–30)
BILIRUBIN, TOTAL: 0.7 MG/DL (ref 0.2–1.2)
BUN: 11 MG/DL (ref 7–25)
CALCIUM: 9.7 MG/DL (ref 8.6–10.2)
CARBON DIOXIDE: 21 MMOL/L (ref 20–32)
CHLORIDE: 108 MMOL/L (ref 98–110)
CHOL/HDLC RATIO: 4.1 (CALC)
CHOLESTEROL, TOTAL: 198 MG/DL
CREATININE: 0.71 MG/DL (ref 0.5–1.1)
EGFR IF AFRICN AM: 132 ML/MIN/1.73M2
EGFR IF NONAFRICN AM: 113 ML/MIN/1.73M2
GLOBULIN: 3.2 G/DL (CALC) (ref 1.9–3.7)
GLUCOSE: 84 MG/DL (ref 65–99)
HDL CHOLESTEROL: 48 MG/DL
LDL-CHOLESTEROL: 131 MG/DL (CALC)
NON-HDL CHOLESTEROL: 150 MG/DL (CALC)
POTASSIUM: 4.3 MMOL/L (ref 3.5–5.3)
PROTEIN, TOTAL: 7.7 G/DL (ref 6.1–8.1)
SODIUM: 137 MMOL/L (ref 135–146)
TRIGLYCERIDES: 93 MG/DL
TSH: 0.73 MIU/L
VITAMIN B12: 842 PG/ML (ref 200–1100)

## 2019-03-21 ENCOUNTER — TELEPHONE (OUTPATIENT)
Dept: INTERNAL MEDICINE CLINIC | Facility: CLINIC | Age: 34
End: 2019-03-21

## 2019-03-21 LAB
ABSOLUTE BASOPHILS: 68 CELLS/UL (ref 0–200)
ABSOLUTE EOSINOPHILS: 146 CELLS/UL (ref 15–500)
ABSOLUTE LYMPHOCYTES: 2435 CELLS/UL (ref 850–3900)
ABSOLUTE MONOCYTES: 427 CELLS/UL (ref 200–950)
ABSOLUTE NEUTROPHILS: 6625 CELLS/UL (ref 1500–7800)
BASOPHILS: 0.7 %
EOSINOPHILS: 1.5 %
GLIADIN (DEAMIDATED)$AB (IGA): 5 UNITS
HEMATOCRIT: 42.4 % (ref 35–45)
HEMOGLOBIN: 14 G/DL (ref 11.7–15.5)
LYMPHOCYTES: 25.1 %
MCH: 27.1 PG (ref 27–33)
MCHC: 33 G/DL (ref 32–36)
MCV: 82.2 FL (ref 80–100)
MONOCYTES: 4.4 %
MPV: 10.8 FL (ref 7.5–12.5)
NEUTROPHILS: 68.3 %
PLATELET COUNT: 365 THOUSAND/UL (ref 140–400)
RDW: 13.7 % (ref 11–15)
RED BLOOD CELL COUNT: 5.16 MILLION/UL (ref 3.8–5.1)
TISSUE TRANSGLUTAMINASE$ANTIBODY, IGA: 1 U/ML
WHITE BLOOD CELL COUNT: 9.7 THOUSAND/UL (ref 3.8–10.8)

## 2019-03-21 NOTE — TELEPHONE ENCOUNTER
Please call pt, believes she has bronchitis  Also having muscle pain and right leg numbness, pt wondering if bronchitis affects this  FYI. Miha Haider Pt had labs yesterday  Please call pt to discuss/advise  Pt uses [facility] [place] as pharmacy  Tasked to nursing

## 2019-03-21 NOTE — TELEPHONE ENCOUNTER
To Dr Laurance Goodpasture - see below - pt has been sick intermittently for last 3-4 weeks. Cough last 4 days. Pt states she has had colds and the flu but cough persists. Pt c/o occ chills, no fever.  Dry cough not affected by position - constant tickle provoking c

## 2019-03-27 ENCOUNTER — OFFICE VISIT (OUTPATIENT)
Dept: INTERNAL MEDICINE CLINIC | Facility: CLINIC | Age: 34
End: 2019-03-27
Payer: COMMERCIAL

## 2019-03-27 VITALS
SYSTOLIC BLOOD PRESSURE: 110 MMHG | TEMPERATURE: 99 F | DIASTOLIC BLOOD PRESSURE: 70 MMHG | HEIGHT: 65 IN | BODY MASS INDEX: 23.16 KG/M2 | WEIGHT: 139 LBS | HEART RATE: 100 BPM | OXYGEN SATURATION: 99 %

## 2019-03-27 DIAGNOSIS — R05.9 COUGH: ICD-10-CM

## 2019-03-27 DIAGNOSIS — M79.10 MYALGIA: ICD-10-CM

## 2019-03-27 DIAGNOSIS — M25.561 ACUTE PAIN OF RIGHT KNEE: Primary | ICD-10-CM

## 2019-03-27 PROBLEM — M25.50 PAIN IN JOINTS: Status: RESOLVED | Noted: 2018-05-09 | Resolved: 2019-03-27

## 2019-03-27 PROBLEM — R14.0 ABDOMINAL BLOATING: Status: RESOLVED | Noted: 2018-05-03 | Resolved: 2019-03-27

## 2019-03-27 PROCEDURE — 99214 OFFICE O/P EST MOD 30 MIN: CPT | Performed by: INTERNAL MEDICINE

## 2019-03-27 PROCEDURE — 99212 OFFICE O/P EST SF 10 MIN: CPT | Performed by: INTERNAL MEDICINE

## 2019-03-27 RX ORDER — ALBUTEROL SULFATE 90 UG/1
2 AEROSOL, METERED RESPIRATORY (INHALATION) EVERY 4 HOURS PRN
Qty: 1 INHALER | Refills: 6 | Status: SHIPPED | OUTPATIENT
Start: 2019-03-27 | End: 2020-01-30

## 2019-03-27 RX ORDER — MELOXICAM 7.5 MG/1
7.5 TABLET ORAL DAILY
Qty: 30 TABLET | Refills: 1 | Status: SHIPPED | OUTPATIENT
Start: 2019-03-27 | End: 2019-05-11

## 2019-03-27 NOTE — PROGRESS NOTES
Suki Owens is a 34year old female. Patient presents with:  Cough: onset 1 month ago off and on, worse at night, taking otc cough meds  Joint Pain: right knee pain, right calf pain, no injury    HPI:     Has had a URI for the past month on/off -- mainly ju (VYVANSE) 50 MG Oral Cap Take 1 capsule (50 mg total) by mouth daily. Disp: 30 capsule Rfl: 0   Lisdexamfetamine Dimesylate (VYVANSE) 50 MG Oral Cap Take 1 capsule (50 mg total) by mouth daily.  Disp: 30 capsule Rfl: 0   acetaminophen (TYLENOL EXTRA STRENGT calf    ASSESSMENT AND PLAN:     1. Acute pain of right knee  Tendinitis? Trial of meloxicam    2. Myalgia/arthralgia  Pt with c/o weakness/pain in forearms, quadriceps, hands.   No tenderness on exam; no objective weakness on exam.  Work-up last year incl

## 2019-05-11 ENCOUNTER — OFFICE VISIT (OUTPATIENT)
Dept: RHEUMATOLOGY | Facility: CLINIC | Age: 34
End: 2019-05-11
Payer: COMMERCIAL

## 2019-05-11 ENCOUNTER — HOSPITAL ENCOUNTER (OUTPATIENT)
Dept: GENERAL RADIOLOGY | Facility: HOSPITAL | Age: 34
Discharge: HOME | End: 2019-05-11
Attending: INTERNAL MEDICINE
Payer: COMMERCIAL

## 2019-05-11 VITALS
HEART RATE: 80 BPM | BODY MASS INDEX: 21.99 KG/M2 | HEIGHT: 65 IN | SYSTOLIC BLOOD PRESSURE: 119 MMHG | DIASTOLIC BLOOD PRESSURE: 82 MMHG | WEIGHT: 132 LBS

## 2019-05-11 DIAGNOSIS — M25.561 CHRONIC PAIN OF RIGHT KNEE: Primary | ICD-10-CM

## 2019-05-11 DIAGNOSIS — G89.29 CHRONIC PAIN OF RIGHT KNEE: Primary | ICD-10-CM

## 2019-05-11 DIAGNOSIS — M25.561 CHRONIC PAIN OF RIGHT KNEE: ICD-10-CM

## 2019-05-11 DIAGNOSIS — G89.29 CHRONIC PAIN OF RIGHT KNEE: ICD-10-CM

## 2019-05-11 DIAGNOSIS — M25.50 POLYARTHRALGIA: ICD-10-CM

## 2019-05-11 PROCEDURE — 99212 OFFICE O/P EST SF 10 MIN: CPT | Performed by: INTERNAL MEDICINE

## 2019-05-11 PROCEDURE — 73560 X-RAY EXAM OF KNEE 1 OR 2: CPT | Performed by: INTERNAL MEDICINE

## 2019-05-11 PROCEDURE — 99214 OFFICE O/P EST MOD 30 MIN: CPT | Performed by: INTERNAL MEDICINE

## 2019-05-11 RX ORDER — ALPRAZOLAM 0.5 MG/1
0.5 TABLET ORAL AS NEEDED
Refills: 2 | COMMUNITY
Start: 2019-04-28 | End: 2020-11-21

## 2019-05-11 RX ORDER — IBUPROFEN 200 MG
400 TABLET ORAL EVERY 6 HOURS PRN
COMMUNITY
End: 2020-11-21

## 2019-05-11 NOTE — PROGRESS NOTES
HPI:    Patient ID: Karina Maza is a 34year old female.     Iqra Garnica is a 72-year-old patient of Dr. 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 Disp:  Rfl: 2   Albuterol Sulfate HFA (PROAIR HFA) 108 (90 Base) MCG/ACT Inhalation Aero Soln Inhale 2 puffs into the lungs every 4 (four) hours as needed for Wheezing.  Disp: 1 Inhaler Rfl: 6   L especially right knee. I referred her to physical therapy for 12 visits. Right knee x-rays will be done. She will schedule back in 6 weeks.         Orders Placed This Encounter      L[facility] - [address] [place] [address] This Visit:  Requested Prescriptions

## 2019-05-15 NOTE — TELEPHONE ENCOUNTER
TO MD:   The pended medication is for a schedule CII medication;   Please review   Print and sign if appropriate and staff will contact the patient for . Last written 4/20/19- #30 with 0RF.

## 2019-06-27 NOTE — PROGRESS NOTES
Rickey Doyle is a 28-year-old patient of Dr. 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  At that time, CBC, CMP, urinalysis, SPEP, Gastrointestinal: Positive for abdominal pain. Skin: Negative for rash. Hematological: Negative for adenopathy.    Allergies:  Minocycline             HIVES  Tetracycline            HIVES     PHYSICAL EXAM:   Blood pressure 119/82, pulse 91, height 5' set up accordingly.

## 2019-06-28 ENCOUNTER — OFFICE VISIT (OUTPATIENT)
Dept: RHEUMATOLOGY | Facility: CLINIC | Age: 34
End: 2019-06-28
Payer: COMMERCIAL

## 2019-06-28 ENCOUNTER — HOSPITAL ENCOUNTER (OUTPATIENT)
Dept: GENERAL RADIOLOGY | Facility: HOSPITAL | Age: 34
Discharge: HOME | End: 2019-06-28
Attending: INTERNAL MEDICINE
Payer: COMMERCIAL

## 2019-06-28 VITALS
BODY MASS INDEX: 21.99 KG/M2 | WEIGHT: 132 LBS | SYSTOLIC BLOOD PRESSURE: 119 MMHG | HEIGHT: 65 IN | DIASTOLIC BLOOD PRESSURE: 82 MMHG | HEART RATE: 91 BPM

## 2019-06-28 DIAGNOSIS — M25.562 CHRONIC PAIN OF BOTH KNEES: Primary | ICD-10-CM

## 2019-06-28 DIAGNOSIS — G89.29 CHRONIC PAIN OF BOTH KNEES: Primary | ICD-10-CM

## 2019-06-28 DIAGNOSIS — G89.29 CHRONIC PAIN OF BOTH KNEES: ICD-10-CM

## 2019-06-28 DIAGNOSIS — M79.7 FIBROMYALGIA: ICD-10-CM

## 2019-06-28 DIAGNOSIS — M25.561 CHRONIC PAIN OF BOTH KNEES: ICD-10-CM

## 2019-06-28 DIAGNOSIS — M25.561 CHRONIC PAIN OF BOTH KNEES: Primary | ICD-10-CM

## 2019-06-28 DIAGNOSIS — M25.562 CHRONIC PAIN OF BOTH KNEES: ICD-10-CM

## 2019-06-28 PROCEDURE — 73560 X-RAY EXAM OF KNEE 1 OR 2: CPT | Performed by: INTERNAL MEDICINE

## 2019-06-28 PROCEDURE — 99213 OFFICE O/P EST LOW 20 MIN: CPT | Performed by: INTERNAL MEDICINE

## 2019-06-28 PROCEDURE — 99212 OFFICE O/P EST SF 10 MIN: CPT | Performed by: INTERNAL MEDICINE

## 2019-06-28 PROCEDURE — 20610 DRAIN/INJ JOINT/BURSA W/O US: CPT | Performed by: INTERNAL MEDICINE

## 2019-06-28 RX ORDER — SERTRALINE HYDROCHLORIDE 100 MG/1
100 TABLET, FILM COATED ORAL DAILY
Refills: 0 | COMMUNITY
Start: 2019-06-24 | End: 2019-10-22

## 2019-06-28 RX ORDER — METHYLPREDNISOLONE ACETATE 40 MG/ML
40 INJECTION, SUSPENSION INTRA-ARTICULAR; INTRALESIONAL; INTRAMUSCULAR; SOFT TISSUE ONCE
Status: COMPLETED | OUTPATIENT
Start: 2019-06-28 | End: 2019-06-28

## 2019-06-28 NOTE — PROCEDURES
Joint Injection  Pre-procedure care:  Consent was obtained, Procedure/risks were explained, Questions were answered, Patient was prepped and draped in the usual sterile fashion, Correct side and site confirmed and Correct patient identified  Chronic pain o

## 2019-07-02 ENCOUNTER — TELEPHONE (OUTPATIENT)
Dept: RHEUMATOLOGY | Facility: CLINIC | Age: 34
End: 2019-07-02

## 2019-07-02 NOTE — TELEPHONE ENCOUNTER
Pt reported via My Chart she schedule MRI of right knee for 7/11/19. Office to call to see if PA is required.

## 2019-07-03 NOTE — TELEPHONE ENCOUNTER
Pt procedure/Testing: MRI R knee   Pt insurance/number to contact: [address] [place] [address] ID# and group: [id number]  Dx: (M25.561,M25.562,G89.29)  CPT: 32364   Indication for test: joint pain, knee  Procedure scheduled date: 7/11/2019  Procedure sc

## 2019-07-09 ENCOUNTER — TELEPHONE (OUTPATIENT)
Dept: INTERNAL MEDICINE CLINIC | Facility: CLINIC | Age: 34
End: 2019-07-09

## 2019-07-09 NOTE — TELEPHONE ENCOUNTER
Patient asking for refill for:    Vyvance 50mg - daily (usually gets 3 scripts)      Call patient when ready for  at:  [phone]

## 2019-07-09 NOTE — TELEPHONE ENCOUNTER
TO MD:   The pended medication is for a schedule CII medication;   Please review   Print and sign if appropriate and staff will contact the patient for . Patient requesting 3 month supply. Last written 5/15/19 #30, ) refills.    Per SAL KELLER las

## 2019-07-11 ENCOUNTER — HOSPITAL ENCOUNTER (OUTPATIENT)
Dept: MRI IMAGING | Facility: HOSPITAL | Age: 34
Discharge: HOME | End: 2019-07-11
Attending: INTERNAL MEDICINE
Payer: COMMERCIAL

## 2019-07-11 DIAGNOSIS — G89.29 CHRONIC PAIN OF BOTH KNEES: ICD-10-CM

## 2019-07-11 DIAGNOSIS — M25.561 CHRONIC PAIN OF BOTH KNEES: ICD-10-CM

## 2019-07-11 DIAGNOSIS — M25.562 CHRONIC PAIN OF BOTH KNEES: ICD-10-CM

## 2019-07-11 PROCEDURE — 73721 MRI JNT OF LWR EXTRE W/O DYE: CPT | Performed by: INTERNAL MEDICINE

## 2019-07-11 PROCEDURE — 73723 MRI JOINT LWR EXTR W/O&W/DYE: CPT | Performed by: INTERNAL MEDICINE

## 2019-07-16 ENCOUNTER — TELEPHONE (OUTPATIENT)
Dept: RHEUMATOLOGY | Facility: CLINIC | Age: 34
End: 2019-07-16

## 2019-07-16 NOTE — TELEPHONE ENCOUNTER
I called Laurent Chambers with the results of her right knee MRI. It shows full-thickness chondral fissure within her medial patellar facet, and grade 3 chondral fissure within her patellar apex. No meniscal or ligament injury. Trace effusion.     She will schedul

## 2019-07-19 ENCOUNTER — OFFICE VISIT (OUTPATIENT)
Dept: RHEUMATOLOGY | Facility: CLINIC | Age: 34
End: 2019-07-19
Payer: COMMERCIAL

## 2019-07-19 VITALS
HEIGHT: 65 IN | DIASTOLIC BLOOD PRESSURE: 89 MMHG | SYSTOLIC BLOOD PRESSURE: 126 MMHG | WEIGHT: 134 LBS | BODY MASS INDEX: 22.33 KG/M2 | HEART RATE: 82 BPM

## 2019-07-19 DIAGNOSIS — M25.561 RECURRENT PAIN OF RIGHT KNEE: ICD-10-CM

## 2019-07-19 DIAGNOSIS — M79.18 MYOFASCIAL PAIN SYNDROME: Primary | ICD-10-CM

## 2019-07-19 PROCEDURE — 99244 OFF/OP CNSLTJ NEW/EST MOD 40: CPT | Performed by: INTERNAL MEDICINE

## 2019-07-19 RX ORDER — CYCLOBENZAPRINE HCL 5 MG
5 TABLET ORAL NIGHTLY
Qty: 30 TABLET | Refills: 0 | Status: SHIPPED | OUTPATIENT
Start: 2019-07-19 | End: 2019-08-15

## 2019-07-19 NOTE — PROGRESS NOTES
Reggie Sandy is a 34year old female who presents for Patient presents with:  Consult: Cordelia, she  states she wants second opinion on OA   . HPI:     I had the pleasure of seeing Reggie Sandy on 7/19/2019 for evaluation of muscle and joint pain.   Patient w 50 MG Oral Cap Take 1 capsule (50 mg total) by mouth daily. Disp: 30 capsule Rfl: 0   [START ON 8/8/2019] Lisdexamfetamine Dimesylate (VYVANSE) 50 MG Oral Cap Take 1 capsule (50 mg total) by mouth daily.  Disp: 30 capsule Rfl: 0   [START ON 9/7/2019] Lisdex disease  RS: No SOB, no Cough, No Pleurtic pain,   GI: No nausea, no vomiiting, + abominal pain, no hx of ulcer, no gastritis, no heartburn, no dyshpagia, no BRBPR or melena  : no dysuria, no hx of miscarriages, no DVT Hx, no hx of OCP,   Neuro: No numbn TOTAL      29 - 143 U/L   62   KATERINA SCREEN, IFA      NEGATIVE   NEGATIVE   CYCLIC CITRULLINATED$PEPTIDE (CCP) AB (IGG)      UNITS  <16    C-REACTIVE PROTEIN      <8.0 mg/L  0.3    LYME AB SCREEN      index <0.90       IMAGING:     MRI R knee:  Full-thicknes

## 2019-07-19 NOTE — PATIENT INSTRUCTIONS
You were seen today for knee pain and muscle pain  For your muscle pain try the flexeril at night, do not take during the day can make you sleepy  If you continue to have pain may send to physiatry for trigger injections  Follow with the ortho doctor for y

## 2019-07-29 ENCOUNTER — PATIENT MESSAGE (OUTPATIENT)
Dept: RHEUMATOLOGY | Facility: CLINIC | Age: 34
End: 2019-07-29

## 2019-07-31 NOTE — TELEPHONE ENCOUNTER
From: Atiya Vasques  To: Farzana Harrell MD  Sent: 7/29/2019 11:59 AM CDT  Subject: Non-Urgent Cristy Hess,  Is it possible to try a different muscle relaxer or a compound lotion, the current one is not working.  Also, can you please christo

## 2019-08-01 NOTE — TELEPHONE ENCOUNTER
She can try lidocaine cream, will send that to her pharamcy  As far as a physiatrist would recommend Dr. Mai Flanagan [phone]

## 2019-08-15 RX ORDER — CYCLOBENZAPRINE HCL 5 MG
5 TABLET ORAL NIGHTLY
Qty: 30 TABLET | Refills: 1 | Status: SHIPPED | OUTPATIENT
Start: 2019-08-15 | End: 2019-09-20

## 2019-08-15 NOTE — TELEPHONE ENCOUNTER
CYCLOBENZAPRINE 5MG Pt is dcd ambulatory. He is with his brother. He has his meds, avs, and all of his belongings.

## 2019-08-15 NOTE — TELEPHONE ENCOUNTER
Last filled: 7/19/2019 #30 tablets w/ 0 refills    LOV: 7/19/2019   Future Appointments   Date Time Provider Bouchra Salas   9/20/2019  1:30 PM Jakob Cormier MD 2014 [facility]     Labs:   Component      Latest Ref Rng & Units 2/23/2019   GLUCOSE

## 2019-08-16 NOTE — TELEPHONE ENCOUNTER
Last filled: 8/1/2019    LOV: 7/19/2019  Future Appointments   Date Time Provider Bouchra Salas   9/20/2019  1:30 PM Levar Lowe MD 2014 [facility]     Labs:   Component      Latest Ref Rng & Units 2/23/2019   GLUCOSE      65 - 99 mg/dL 84   BUN

## 2019-08-21 ENCOUNTER — OFFICE VISIT (OUTPATIENT)
Dept: PHYSICAL THERAPY | Age: 34
End: 2019-08-21
Attending: INTERNAL MEDICINE
Payer: COMMERCIAL

## 2019-08-21 DIAGNOSIS — M25.561 CHRONIC PAIN OF RIGHT KNEE: ICD-10-CM

## 2019-08-21 DIAGNOSIS — G89.29 CHRONIC PAIN OF RIGHT KNEE: ICD-10-CM

## 2019-08-21 DIAGNOSIS — M25.50 POLYARTHRALGIA: ICD-10-CM

## 2019-08-21 PROCEDURE — 97162 PT EVAL MOD COMPLEX 30 MIN: CPT

## 2019-08-21 PROCEDURE — 97110 THERAPEUTIC EXERCISES: CPT

## 2019-08-21 NOTE — PROGRESS NOTES
LOWER EXTREMITY / SPINE EVALUATION:   Referring Physician: Dr. Heaven Tee  Diagnosis: Chronic pain of right knee (M25.561,G89.29);  Polyarthralgia (M25.50)     Date of Service: 8/21/2019     PATIENT SUMMARY   Cas Velázquez is a 34year old y/o female who pr show a general loss of strength, flexibility, endurance, posture, and body mechanics. Functional deficits include but are not limited to bending lifting, carrying, walking, negotiating stairs, and household chores.   Signs and symptoms are consistent with Treatment Time: 48 min     Based on clinical rationale and outcome measures, this evaluation involved Moderate Complexity decision making due to 1-2 personal factors/comorbidities, 3 body structures involved/activity limitations, and evolving symptoms incl

## 2019-08-23 ENCOUNTER — OFFICE VISIT (OUTPATIENT)
Dept: PHYSICAL THERAPY | Age: 34
End: 2019-08-23
Attending: INTERNAL MEDICINE
Payer: COMMERCIAL

## 2019-08-23 DIAGNOSIS — G89.29 CHRONIC PAIN OF RIGHT KNEE: ICD-10-CM

## 2019-08-23 DIAGNOSIS — M25.561 CHRONIC PAIN OF RIGHT KNEE: ICD-10-CM

## 2019-08-23 DIAGNOSIS — M25.50 POLYARTHRALGIA: ICD-10-CM

## 2019-08-23 PROCEDURE — 97110 THERAPEUTIC EXERCISES: CPT

## 2019-08-23 NOTE — PROGRESS NOTES
Dx:  Chronic pain of right knee (M25.561,G89.29);  Polyarthralgia (M25.50)         Insurance (Authorized # of Visits): [employer] Physician: Dr. Linda Ernst MD visit: (to schedule)  Fall Risk: standard         Precautions: n piriformis stretch; 1/2 foam roller stretches      Charges: there ex 3     Total Timed Treatment: 40 min  Total Treatment Time:  42 min

## 2019-08-28 ENCOUNTER — OFFICE VISIT (OUTPATIENT)
Dept: PHYSICAL THERAPY | Age: 34
End: 2019-08-28
Attending: INTERNAL MEDICINE
Payer: COMMERCIAL

## 2019-08-28 DIAGNOSIS — M25.50 POLYARTHRALGIA: ICD-10-CM

## 2019-08-28 DIAGNOSIS — G89.29 CHRONIC PAIN OF RIGHT KNEE: ICD-10-CM

## 2019-08-28 DIAGNOSIS — M25.561 CHRONIC PAIN OF RIGHT KNEE: ICD-10-CM

## 2019-08-28 PROCEDURE — 97110 THERAPEUTIC EXERCISES: CPT

## 2019-08-28 PROCEDURE — 97140 MANUAL THERAPY 1/> REGIONS: CPT

## 2019-08-28 NOTE — PROGRESS NOTES
Dx:  Chronic pain of right knee (M25.561,G89.29);  Polyarthralgia (M25.50)         Insurance (Authorized # of Visits): Anita Hodge Physician: Dr. Sahra Espinoza  Next MD visit: (to schedule)  Fall Risk: standard         Precautions: n 1/2 foam roller 20x  L/R SLR on foam roller 10x  March on foam roll 2 x 10  Hip iso abd/add on foam roller 5 cts 2 x 10  Prone BUE horz abd 20x  Doorway stretch 6 x 10 cts                              Manual: Prone cervicothoracic grade II-III mobilization

## 2019-08-30 ENCOUNTER — OFFICE VISIT (OUTPATIENT)
Dept: PHYSICAL THERAPY | Age: 34
End: 2019-08-30
Attending: INTERNAL MEDICINE
Payer: COMMERCIAL

## 2019-08-30 DIAGNOSIS — G89.29 CHRONIC PAIN OF RIGHT KNEE: ICD-10-CM

## 2019-08-30 DIAGNOSIS — M25.561 CHRONIC PAIN OF RIGHT KNEE: ICD-10-CM

## 2019-08-30 DIAGNOSIS — M25.50 POLYARTHRALGIA: ICD-10-CM

## 2019-08-30 PROCEDURE — 97110 THERAPEUTIC EXERCISES: CPT

## 2019-08-30 PROCEDURE — 97140 MANUAL THERAPY 1/> REGIONS: CPT

## 2019-08-30 NOTE — PROGRESS NOTES
Dx:  Chronic pain of right knee (M25.561,G89.29);  Polyarthralgia (M25.50)         Insurance (Authorized # of Visits): Vanesa Rosenberg Physician: Dr. Brionna Ernst MD visit: (to schedule)  Fall Risk: standard         Precautions: n 2 x 10  Hip iso abd/add on foam roller 5 cts 2 x 10  Prone BUE horz abd 20x  Doorway stretch 6 x 10 cts          There ex:  Laying 1/2 foam roll chest stretch 2 x 1 min  Horz abd/diagonals BTB 20x 1/2 foam roll  Arm circles 1/2 foam roller 20x  L/R SLR on

## 2019-09-04 ENCOUNTER — OFFICE VISIT (OUTPATIENT)
Dept: PHYSICAL THERAPY | Age: 34
End: 2019-09-04
Attending: INTERNAL MEDICINE
Payer: COMMERCIAL

## 2019-09-04 DIAGNOSIS — M25.561 CHRONIC PAIN OF RIGHT KNEE: ICD-10-CM

## 2019-09-04 DIAGNOSIS — G89.29 CHRONIC PAIN OF RIGHT KNEE: ICD-10-CM

## 2019-09-04 DIAGNOSIS — M25.50 POLYARTHRALGIA: ICD-10-CM

## 2019-09-04 PROCEDURE — 97530 THERAPEUTIC ACTIVITIES: CPT

## 2019-09-04 PROCEDURE — 97110 THERAPEUTIC EXERCISES: CPT

## 2019-09-04 NOTE — PROGRESS NOTES
Dx:  Chronic pain of right knee (M25.561,G89.29);  Polyarthralgia (M25.50)         Insurance (Authorized # of Visits): Humberto Pruett Physician: Dr. Siddhartha Ernst MD visit: (to schedule)  Fall Risk: standard         Precautions: n Date 8/28/19                 TX#: 3 Date: 8/30/19         TX#: 4 Date: 9/4/19                TX#: 5 Date: Tx#: 6   There ex:   TrA training   LTR with SB  Hamstring stretch 3 x 20 cts  Bridging with pilates Cheesh-Na 2 x 10   Piriformis stretch 5 x 15 cts  S

## 2019-09-11 ENCOUNTER — OFFICE VISIT (OUTPATIENT)
Dept: PHYSICAL THERAPY | Age: 34
End: 2019-09-11
Attending: INTERNAL MEDICINE
Payer: COMMERCIAL

## 2019-09-11 DIAGNOSIS — M25.50 POLYARTHRALGIA: ICD-10-CM

## 2019-09-11 DIAGNOSIS — G89.29 CHRONIC PAIN OF RIGHT KNEE: ICD-10-CM

## 2019-09-11 DIAGNOSIS — M25.561 CHRONIC PAIN OF RIGHT KNEE: ICD-10-CM

## 2019-09-11 PROCEDURE — 97110 THERAPEUTIC EXERCISES: CPT

## 2019-09-11 NOTE — PROGRESS NOTES
Dx:  Chronic pain of right knee (M25.561,G89.29);  Polyarthralgia (M25.50)         Insurance (Authorized # of Visits): Jasper Lindsey Physician: Dr. Caitlyn Ernst MD visit: (to schedule)  Fall Risk: standard         Precautions: n stretch 2 x 1 min  'bear hug' 1/2 foam roll 2 x 10   Arm circles 1/2 foam roller 20x  L/R SLR on foam roller 10x  March on foam roll 2 x 10  Hip iso abd/add on foam roller 5 cts 2 x 10  Prone BUE horz abd 20x  Doorway stretch 6 x 10 cts          There ex:

## 2019-09-13 ENCOUNTER — APPOINTMENT (OUTPATIENT)
Dept: PHYSICAL THERAPY | Age: 34
End: 2019-09-13
Attending: INTERNAL MEDICINE
Payer: COMMERCIAL

## 2019-09-18 ENCOUNTER — OFFICE VISIT (OUTPATIENT)
Dept: PHYSICAL THERAPY | Age: 34
End: 2019-09-18
Attending: INTERNAL MEDICINE
Payer: COMMERCIAL

## 2019-09-18 DIAGNOSIS — M25.50 POLYARTHRALGIA: ICD-10-CM

## 2019-09-18 DIAGNOSIS — M25.561 CHRONIC PAIN OF RIGHT KNEE: ICD-10-CM

## 2019-09-18 DIAGNOSIS — G89.29 CHRONIC PAIN OF RIGHT KNEE: ICD-10-CM

## 2019-09-18 PROCEDURE — 97110 THERAPEUTIC EXERCISES: CPT

## 2019-09-18 NOTE — PROGRESS NOTES
Dx:  Chronic pain of right knee (M25.561,G89.29);  Polyarthralgia (M25.50)         Insurance (Authorized # of Visits): Ruddy Tobar Physician: Dr. Del Maciel  Next MD visit: (to schedule)  Fall Risk: standard         Precautions: n min  'bear hug' 1/2 foam roll 2 x 10     Arm circles 1/2 foam roller 20x There ex:  Laying 1/2 foam roll chest stretch 2 x 1 min  'bear hug' 1/2 foam roll 2 x 10   Arm circles 1/2 foam roller 20x  L/R SLR on foam roller 10x  March on foam roll 2 x 10  Hip

## 2019-09-20 ENCOUNTER — OFFICE VISIT (OUTPATIENT)
Dept: RHEUMATOLOGY | Facility: CLINIC | Age: 34
End: 2019-09-20
Payer: COMMERCIAL

## 2019-09-20 VITALS
HEART RATE: 84 BPM | RESPIRATION RATE: 16 BRPM | DIASTOLIC BLOOD PRESSURE: 81 MMHG | SYSTOLIC BLOOD PRESSURE: 117 MMHG | WEIGHT: 135 LBS | BODY MASS INDEX: 22.49 KG/M2 | HEIGHT: 65 IN

## 2019-09-20 DIAGNOSIS — M25.50 POLYARTHRALGIA: ICD-10-CM

## 2019-09-20 DIAGNOSIS — M25.561 RECURRENT PAIN OF RIGHT KNEE: ICD-10-CM

## 2019-09-20 DIAGNOSIS — M79.18 MYOFASCIAL PAIN SYNDROME: Primary | ICD-10-CM

## 2019-09-20 PROCEDURE — 99214 OFFICE O/P EST MOD 30 MIN: CPT | Performed by: INTERNAL MEDICINE

## 2019-09-20 RX ORDER — CYCLOBENZAPRINE HCL 5 MG
10 TABLET ORAL NIGHTLY
Qty: 60 TABLET | Refills: 1 | Status: SHIPPED | OUTPATIENT
Start: 2019-09-20 | End: 2019-11-20

## 2019-09-20 NOTE — PATIENT INSTRUCTIONS
You were seen today for myofascial pain syndrome  Cont the flexeril at night  Cont the lidocaine cream as needed  F/u 6 mos

## 2019-09-20 NOTE — PROGRESS NOTES
Atiya Vasques is a 30year old female. HPI:   Patient presents with: Follow - Up: Myofascial  Knee Pain: Right, F/U      I had the pleasure of seeing Patricia Ordonez on 9/20/2019 for follow up Myofascial pain syndrome and R knee pain.      Current Medications reported   • Strep sore throat 02/10/95      Social Hx Reviewed   Family Hx Reviewed     Medications (Active prior to today's visit):    Current Outpatient Medications:  Diclofenac Sodium 1 % Transdermal Gel Apply 2 g topically 4 (four) times daily.  Disp: trapezius and shoulders  MSK:  Cervical spine: FROM  Hands: no synovitis in DIP, PIP and MCP, strong full fists  Wrist: FROM, no pain or swelling or warmth on palpation  Elbow: FROM, no pain or swelling or warmth on palpation  Shoulders: FROM, no pain or s full-thickness chondral fissure  - Received steroid injections with minimal relief  - followed with ortho and no intervention needed  - cont PT    Tremors  - stopped caffeine   - recently stopped Vyvanse, possibly secondary to discontinuing this medication

## 2019-09-25 ENCOUNTER — OFFICE VISIT (OUTPATIENT)
Dept: PHYSICAL THERAPY | Age: 34
End: 2019-09-25
Attending: INTERNAL MEDICINE
Payer: COMMERCIAL

## 2019-09-25 PROCEDURE — 97110 THERAPEUTIC EXERCISES: CPT

## 2019-09-25 NOTE — PROGRESS NOTES
DISCHARGE SUMMARY   Dx:  Chronic pain of right knee (M25.561,G89.29);  Polyarthralgia (M25.50)         Insurance (Authorized # of Visits): Dot Marroquin Physician: Dr. Sujata CUENCA visit: (to schedule)  Fall Risk: standard         Prec independent with a progressive HEP necessary to manage symptoms during ADLs (cutting/slicing)-MET      2) The patient will demonstrate 5/5 MMT of bilateral shoulder ER/abd necessary to lift and carry her toddlers without limitation- 1/2 MET     3) Pt will you have any questions, please contact me at Dept: [phone].     Sincerely,  Electronically signed by therapist: Torres Gregory, PT,DPT,OCS

## 2019-10-22 ENCOUNTER — OFFICE VISIT (OUTPATIENT)
Dept: INTEGRATIVE MEDICINE | Facility: CLINIC | Age: 34
End: 2019-10-22
Payer: COMMERCIAL

## 2019-10-22 ENCOUNTER — APPOINTMENT (OUTPATIENT)
Dept: LAB | Facility: REFERENCE LAB | Age: 34
End: 2019-10-22
Attending: FAMILY MEDICINE
Payer: COMMERCIAL

## 2019-10-22 VITALS
OXYGEN SATURATION: 98 % | DIASTOLIC BLOOD PRESSURE: 62 MMHG | HEIGHT: 65 IN | HEART RATE: 96 BPM | BODY MASS INDEX: 22.33 KG/M2 | SYSTOLIC BLOOD PRESSURE: 90 MMHG | WEIGHT: 134 LBS

## 2019-10-22 DIAGNOSIS — M79.7 FIBROMYALGIA: ICD-10-CM

## 2019-10-22 DIAGNOSIS — M79.7 FIBROMYALGIA: Primary | ICD-10-CM

## 2019-10-22 DIAGNOSIS — R14.0 BLOATING: ICD-10-CM

## 2019-10-22 DIAGNOSIS — L70.0 ACNE VULGARIS: ICD-10-CM

## 2019-10-22 DIAGNOSIS — F41.1 ANXIETY STATE: ICD-10-CM

## 2019-10-22 DIAGNOSIS — F98.8 ATTENTION DEFICIT DISORDER, UNSPECIFIED HYPERACTIVITY PRESENCE: ICD-10-CM

## 2019-10-22 PROCEDURE — 86800 THYROGLOBULIN ANTIBODY: CPT

## 2019-10-22 PROCEDURE — 84443 ASSAY THYROID STIM HORMONE: CPT

## 2019-10-22 PROCEDURE — 84481 FREE ASSAY (FT-3): CPT

## 2019-10-22 PROCEDURE — 99205 OFFICE O/P NEW HI 60 MIN: CPT | Performed by: FAMILY MEDICINE

## 2019-10-22 PROCEDURE — 84439 ASSAY OF FREE THYROXINE: CPT

## 2019-10-22 PROCEDURE — 86376 MICROSOMAL ANTIBODY EACH: CPT

## 2019-10-22 PROCEDURE — 86628 CANDIDA ANTIBODY: CPT

## 2019-10-22 PROCEDURE — 36415 COLL VENOUS BLD VENIPUNCTURE: CPT

## 2019-10-22 RX ORDER — BACLOFEN 10 MG/1
10 TABLET ORAL 3 TIMES DAILY PRN
Qty: 30 TABLET | Refills: 0 | Status: SHIPPED | OUTPATIENT
Start: 2019-10-22 | End: 2019-11-10
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
## 2019-10-22 NOTE — PATIENT INSTRUCTIONS
I have complete tenisha in the body's ability to heal and transform. The products and items listed below (the “Products”)  and their claims have not been evaluated by the Food and Drug Administration.  Dietary products are not intended to treat, prevent, m [employer] Hemp CBD oil (Eliana Hair or Raw)    Where to Find: [email or web address]  Directions: 1/4 dropperful twice daily under the tongue  Why: Nabil Squires is a blend of full spectrum hemp oil and other essential nutrients    [facility] PLEASE NOTE: As this is a lab test done by an outside company, these results do not pull into your [employer] lab results online.  A copy of the results typically will be given to you when you follow up to discuss the results in the office unless otherwise spe

## 2019-11-11 RX ORDER — BACLOFEN 10 MG/1
10 TABLET ORAL 3 TIMES DAILY PRN
Qty: 30 TABLET | Refills: 0 | Status: SHIPPED | OUTPATIENT
Start: 2019-11-11 | End: 2020-07-10

## 2019-11-11 NOTE — TELEPHONE ENCOUNTER
A refill request was received for:  Requested Prescriptions     Pending Prescriptions Disp Refills   • BACLOFEN 10 MG Oral Tab [Pharmacy Med Name: BACLOFEN 10 MG TABLET] 30 tablet 0     Sig: TAKE 1 TABLET (10 MG TOTAL) BY MOUTH 3 (THREE) TIMES DAILY AS NEE

## 2019-11-14 NOTE — TELEPHONE ENCOUNTER
Pt is calling in reg to her MRI results per pt no one has reached out to her and needs results explained to her. Please Advise. Started first trimester

## 2019-11-19 NOTE — TELEPHONE ENCOUNTER
Fax received requesting refill. Current Outpatient Medications   Medication Sig Dispense Refill   • Cyclobenzaprine HCl 5 MG Oral Tab Take 2 tablets (10 mg total) by mouth nightly.  60 tablet 1

## 2019-11-19 NOTE — TELEPHONE ENCOUNTER
LOV: 9/20/19 medication was approved at visit #60 with 1 refill. Please advise.   Future Appointments   Date Time Provider Bouchra Salas   12/4/2019 12:30 PM Suresh Hernandez DO [facility] INT [facility]     ASSESSMENT/PLAN:      This is a 30 yo F with hx

## 2019-11-20 RX ORDER — CYCLOBENZAPRINE HCL 5 MG
10 TABLET ORAL NIGHTLY
Qty: 60 TABLET | Refills: 1 | Status: SHIPPED | OUTPATIENT
Start: 2019-11-20 | End: 2020-01-29

## 2019-12-04 ENCOUNTER — OFFICE VISIT (OUTPATIENT)
Dept: INTEGRATIVE MEDICINE | Facility: CLINIC | Age: 34
End: 2019-12-04
Payer: COMMERCIAL

## 2019-12-04 VITALS
WEIGHT: 135.81 LBS | OXYGEN SATURATION: 99 % | HEIGHT: 65 IN | BODY MASS INDEX: 22.63 KG/M2 | DIASTOLIC BLOOD PRESSURE: 80 MMHG | HEART RATE: 80 BPM | SYSTOLIC BLOOD PRESSURE: 114 MMHG

## 2019-12-04 DIAGNOSIS — F98.8 ATTENTION DEFICIT DISORDER, UNSPECIFIED HYPERACTIVITY PRESENCE: ICD-10-CM

## 2019-12-04 DIAGNOSIS — T78.1XXA FOOD SENSITIVITY WITH GASTROINTESTINAL SYMPTOMS: ICD-10-CM

## 2019-12-04 DIAGNOSIS — B37.9 CANDIDIASIS: ICD-10-CM

## 2019-12-04 DIAGNOSIS — R79.89 LOW VITAMIN D LEVEL: ICD-10-CM

## 2019-12-04 DIAGNOSIS — F41.1 ANXIETY STATE: ICD-10-CM

## 2019-12-04 DIAGNOSIS — K90.41 GLUTEN INTOLERANCE: ICD-10-CM

## 2019-12-04 DIAGNOSIS — G25.0 ESSENTIAL TREMOR: ICD-10-CM

## 2019-12-04 DIAGNOSIS — L70.0 ACNE VULGARIS: ICD-10-CM

## 2019-12-04 DIAGNOSIS — R14.0 BLOATING: ICD-10-CM

## 2019-12-04 DIAGNOSIS — M79.7 FIBROMYALGIA: Primary | ICD-10-CM

## 2019-12-04 PROCEDURE — 99214 OFFICE O/P EST MOD 30 MIN: CPT | Performed by: FAMILY MEDICINE

## 2019-12-04 RX ORDER — SERTRALINE HYDROCHLORIDE 100 MG/1
200 TABLET, FILM COATED ORAL
Refills: 0 | COMMUNITY
Start: 2019-11-11 | End: 2020-01-22

## 2019-12-04 NOTE — PATIENT INSTRUCTIONS
I have complete tenisha in the body's ability to heal and transform. The products and items listed below (the “Products”)  and their claims have not been evaluated by the Food and Drug Administration.  Dietary products are not intended to treat, prevent, m and relaxation, especially when faced with very stressful situations. It comes in drops, spray, cream or lozenges. This supplement is found at [employer] or [employer] or you can buy it online.         Here are you recommendations based on your micron

## 2019-12-04 NOTE — PROGRESS NOTES
Demetra Herrera is a 30year old female. Patient presents with: Integrative Medicine Consult: 6 wk f/u      HPI:     Feeling good, not needing muscle relaxants every day. Over the past 2 weeks having much less severe pain.    Avoiding all of her food sensit • ALPRAZolam 0.5 MG Oral Tab Take 0.5 mg by mouth as needed. 2   • Albuterol Sulfate HFA (PROAIR HFA) 108 (90 Base) MCG/ACT Inhalation Aero Soln Inhale 2 puffs into the lungs every 4 (four) hours as needed for Wheezing.  1 Inhaler 6   • Probiotic Product ( Concerns:         Service: Not Asked        Blood Transfusions: Not Asked        Caffeine Concern: Yes          Coffee, tablets.  More than 6 cups per day        Occupational Exposure: Not Asked        Hobby Hazards: Not Asked        Sleep Kristin Given further recommendations as below    Orders Placed This Visit:  No orders of the defined types were placed in this encounter. No orders of the defined types were placed in this encounter.       Patient Instructions   I have complete tenisha in the bod Why: Triple Complex Calm Tonic is the premier safe and effective, non-addictive, natural remedy for occasional bouts of anxiety.  It contains 100% homeopathic ingredients especially selected to temporarily relieve stress, worry and nervous tension, all with

## 2019-12-12 NOTE — TELEPHONE ENCOUNTER
Pt requesting refill for:  Sage  Pt believes that previous RX   Pt stopped taking for a bit  Pt will  RX when ready  Pt is low on medication  Tasked to RX

## 2020-01-20 RX ORDER — ALBUTEROL SULFATE 90 UG/1
AEROSOL, METERED RESPIRATORY (INHALATION)
Qty: 8.5 INHALER | Refills: 0 | OUTPATIENT
Start: 2020-01-20

## 2020-01-20 NOTE — TELEPHONE ENCOUNTER
To FD pls call pt to schedule an annual OV if pt still seeing DR. NULL ; may have changed to Amena Rodriguez

## 2020-01-22 ENCOUNTER — OFFICE VISIT (OUTPATIENT)
Dept: INTERNAL MEDICINE CLINIC | Facility: CLINIC | Age: 35
End: 2020-01-22
Payer: COMMERCIAL

## 2020-01-22 ENCOUNTER — APPOINTMENT (OUTPATIENT)
Dept: LAB | Age: 35
End: 2020-01-22
Attending: INTERNAL MEDICINE
Payer: COMMERCIAL

## 2020-01-22 VITALS
TEMPERATURE: 99 F | BODY MASS INDEX: 21.99 KG/M2 | SYSTOLIC BLOOD PRESSURE: 100 MMHG | DIASTOLIC BLOOD PRESSURE: 72 MMHG | HEIGHT: 65 IN | WEIGHT: 132 LBS | HEART RATE: 88 BPM

## 2020-01-22 DIAGNOSIS — R35.0 URINARY FREQUENCY: ICD-10-CM

## 2020-01-22 DIAGNOSIS — F41.1 ANXIETY STATE: ICD-10-CM

## 2020-01-22 DIAGNOSIS — Z00.00 ROUTINE HEALTH MAINTENANCE: ICD-10-CM

## 2020-01-22 DIAGNOSIS — R53.83 OTHER FATIGUE: ICD-10-CM

## 2020-01-22 DIAGNOSIS — R35.0 URINARY FREQUENCY: Primary | ICD-10-CM

## 2020-01-22 LAB
BILIRUB UR QL: NEGATIVE
CLARITY UR: CLEAR
COLOR UR: YELLOW
GLUCOSE UR-MCNC: NEGATIVE MG/DL
HGB UR QL STRIP.AUTO: NEGATIVE
KETONES UR-MCNC: NEGATIVE MG/DL
NITRITE UR QL STRIP.AUTO: NEGATIVE
PH UR: 7 [PH] (ref 5–8)
PROT UR-MCNC: NEGATIVE MG/DL
RBC #/AREA URNS AUTO: 1 /HPF
SP GR UR STRIP: 1.01 (ref 1–1.03)
UROBILINOGEN UR STRIP-ACNC: <2
WBC #/AREA URNS AUTO: 1 /HPF

## 2020-01-22 PROCEDURE — 90686 IIV4 VACC NO PRSV 0.5 ML IM: CPT | Performed by: INTERNAL MEDICINE

## 2020-01-22 PROCEDURE — 87086 URINE CULTURE/COLONY COUNT: CPT

## 2020-01-22 PROCEDURE — 99395 PREV VISIT EST AGE 18-39: CPT | Performed by: INTERNAL MEDICINE

## 2020-01-22 PROCEDURE — 81001 URINALYSIS AUTO W/SCOPE: CPT

## 2020-01-22 PROCEDURE — 90471 IMMUNIZATION ADMIN: CPT | Performed by: INTERNAL MEDICINE

## 2020-01-22 NOTE — PROGRESS NOTES
Katie Samuel is a 30year old female. Patient presents with:  Physical: Patient is here today for a PE. In general she has been doing okay lately.  She notes that she is still having issues with generalized \"soreness\"-- abdominal bloating, dry mouth, fatigu capsule 0   • Cyclobenzaprine HCl 5 MG Oral Tab Take 2 tablets (10 mg total) by mouth nightly. 60 tablet 1   • BACLOFEN 10 MG Oral Tab TAKE 1 TABLET (10 MG TOTAL) BY MOUTH 3 (THREE) TIMES DAILY AS NEEDED (MUSCLE SPASM).  30 tablet 0   • PRENATAL COMPLETE OR exertion or cough  CARDIOVASCULAR: denies chest pain, pressure, or palpitations  GI: denies abdominal pain, nausea, vomiting, diarrhea, constipation, hematochezia, or melena  NEURO: denies headaches or dizziness    EXAM:   /72   Pulse 88   Temp 98.7 testing with Dr. Brendon March. Urinary frequency  Check UA, cx    RTC 1 yr/prn.     Will Alcazar, 01/22/20, 2:55 PM

## 2020-01-28 NOTE — TELEPHONE ENCOUNTER
Last filled: 11/20/19 #60 tab with 1 refill  LOV: 9/20/19  Future Appointments   Date Time Provider Bouchra Maritza   1[facility]30/2020  3:30 [facility]       ASSESSMENT/PLAN:      This is a 30 yo F with hx Anxiety presents for

## 2020-01-29 RX ORDER — CYCLOBENZAPRINE HCL 5 MG
10 TABLET ORAL NIGHTLY
Qty: 60 TABLET | Refills: 1 | Status: SHIPPED | OUTPATIENT
Start: 2020-01-29 | End: 2020-07-10

## 2020-01-30 ENCOUNTER — OFFICE VISIT (OUTPATIENT)
Dept: INTERNAL MEDICINE CLINIC | Facility: CLINIC | Age: 35
End: 2020-01-30
Payer: COMMERCIAL

## 2020-01-30 VITALS
OXYGEN SATURATION: 99 % | TEMPERATURE: 99 F | WEIGHT: 132 LBS | DIASTOLIC BLOOD PRESSURE: 82 MMHG | BODY MASS INDEX: 21.99 KG/M2 | SYSTOLIC BLOOD PRESSURE: 114 MMHG | HEART RATE: 89 BPM | HEIGHT: 65 IN

## 2020-01-30 DIAGNOSIS — Z12.4 SCREENING FOR CERVICAL CANCER: Primary | ICD-10-CM

## 2020-01-30 DIAGNOSIS — J45.20 MILD INTERMITTENT ASTHMA WITHOUT COMPLICATION: ICD-10-CM

## 2020-01-30 PROCEDURE — 99213 OFFICE O/P EST LOW 20 MIN: CPT | Performed by: INTERNAL MEDICINE

## 2020-01-30 RX ORDER — ALBUTEROL SULFATE 90 UG/1
2 AEROSOL, METERED RESPIRATORY (INHALATION) EVERY 4 HOURS PRN
Qty: 1 INHALER | Refills: 6 | Status: SHIPPED | OUTPATIENT
Start: 2020-01-30

## 2020-01-30 NOTE — PROGRESS NOTES
Keith White is a 30year old female. Patient presents with:  Pap: Pt here to have pap done. Medication Request: Pt requesting refill on albuterol inhaler    HPI:   Here for Pap. Requests RF on albuterol.   Uses occasionally prn      Current Outpatient Med Currently      Comment: once per month    Drug use: Yes      Types: Cannabis      Comment: try marijuana for a day       REVIEW OF SYSTEMS:   GENERAL HEALTH: feels well otherwise  RESPIRATORY: no SOB  CARDIOVASCULAR: no chest pain/pressure  GI: no nausea,

## 2020-02-04 ENCOUNTER — PATIENT MESSAGE (OUTPATIENT)
Dept: INTERNAL MEDICINE CLINIC | Facility: CLINIC | Age: 35
End: 2020-02-04

## 2020-02-04 LAB
HPV I/H RISK 1 DNA SPEC QL NAA+PROBE: NEGATIVE
LAST PAP RESULT: NORMAL

## 2020-04-13 ENCOUNTER — TELEPHONE (OUTPATIENT)
Dept: FAMILY MEDICINE CLINIC | Facility: CLINIC | Age: 35
End: 2020-04-13

## 2020-04-13 DIAGNOSIS — R05.9 COUGH: ICD-10-CM

## 2020-04-13 DIAGNOSIS — R50.9 FEVER, UNSPECIFIED FEVER CAUSE: ICD-10-CM

## 2020-04-13 DIAGNOSIS — Z20.822 SUSPECTED 2019 NOVEL CORONAVIRUS INFECTION: Primary | ICD-10-CM

## 2020-04-13 NOTE — TELEPHONE ENCOUNTER
Pt's  calling concerning wife - symptoms started 4 days ago. Reports sore throat, worsening dry cough, low grade fever, some shortness of breath, body aches, and fatigue. Patient is asthmatic, states albuterol is not helping.  Possible exposure to CO

## 2020-04-14 ENCOUNTER — LAB ENCOUNTER (OUTPATIENT)
Dept: LAB | Facility: HOSPITAL | Age: 35
End: 2020-04-14
Attending: PHYSICIAN ASSISTANT
Payer: COMMERCIAL

## 2020-04-14 DIAGNOSIS — R05.9 COUGH: ICD-10-CM

## 2020-04-14 DIAGNOSIS — R50.9 FEVER, UNSPECIFIED FEVER CAUSE: ICD-10-CM

## 2020-04-14 DIAGNOSIS — Z20.822 SUSPECTED 2019 NOVEL CORONAVIRUS INFECTION: ICD-10-CM

## 2020-04-15 ENCOUNTER — TELEPHONE (OUTPATIENT)
Dept: INTERNAL MEDICINE CLINIC | Facility: CLINIC | Age: 35
End: 2020-04-15

## 2020-04-15 DIAGNOSIS — J20.9 BRONCHITIS WITH BRONCHOSPASM: Primary | ICD-10-CM

## 2020-04-15 PROCEDURE — 99213 OFFICE O/P EST LOW 20 MIN: CPT | Performed by: INTERNAL MEDICINE

## 2020-04-15 RX ORDER — BENZONATATE 100 MG/1
100 CAPSULE ORAL 3 TIMES DAILY PRN
Qty: 30 CAPSULE | Refills: 1 | Status: SHIPPED | OUTPATIENT
Start: 2020-04-15 | End: 2020-07-10

## 2020-04-15 RX ORDER — PREDNISONE 20 MG/1
TABLET ORAL
Qty: 12 TABLET | Refills: 0 | Status: SHIPPED | OUTPATIENT
Start: 2020-04-15 | End: 2020-07-10 | Stop reason: ALTCHOICE

## 2020-04-15 NOTE — TELEPHONE ENCOUNTER
Virtual Telephone Check-In    Argelia Benitez verbally consents to a Virtual/Telephone Check-In visit on 04/15/20. Patient understands and accepts financial responsibility for any deductible, co-insurance and/or co-pays associated with this service.     Dura

## 2020-04-15 NOTE — TELEPHONE ENCOUNTER
From: Steven Almaguer  Sent: 4/15/2020 4:45 PM CDT  To: [facility] Clinical Staff  Subject: Chito Shi,    I went for the COVID-19 test yesterday and it came back negative. I have a bad cough, low fever and have a hard time breathing.  My inhal

## 2020-04-15 NOTE — TELEPHONE ENCOUNTER
Regarding: RE:Pap  Contact: [phone]  ----- Message from Vlad Lubin RN sent at 4/15/2020  4:59 PM CDT -----       ----- Message from Lucina Jimenes to Jhon Cummins MD sent at 4/15/2020  4:45 PM -----   Yvon Lakhani,    I went for

## 2020-04-22 NOTE — TELEPHONE ENCOUNTER
Pt contacted, telephone appt scheduled for Thursday, 2/23/20 at 12:30   Pt consents and agrees to this appt   Tasked to Dr Thaddeus Stern as Marty Villarreal

## 2020-04-22 NOTE — TELEPHONE ENCOUNTER
To Dr. Miko Nuno----    Received the following via [employer]: \"Hey Dr. Zuly Henderson,  I hope you are doing well during all this craziness.  I felt like I was getting better on the prednisone and was combining/switching off on the benzonate (I refilled this prescription

## 2020-04-23 ENCOUNTER — VIRTUAL PHONE E/M (OUTPATIENT)
Dept: INTERNAL MEDICINE CLINIC | Facility: CLINIC | Age: 35
End: 2020-04-23
Payer: COMMERCIAL

## 2020-04-23 DIAGNOSIS — J20.9 ACUTE BRONCHITIS, UNSPECIFIED ORGANISM: Primary | ICD-10-CM

## 2020-04-23 DIAGNOSIS — J45.21 MILD INTERMITTENT ASTHMA WITH EXACERBATION: ICD-10-CM

## 2020-04-23 PROCEDURE — 99212 OFFICE O/P EST SF 10 MIN: CPT | Performed by: INTERNAL MEDICINE

## 2020-04-23 RX ORDER — PREDNISONE 20 MG/1
TABLET ORAL
Qty: 12 TABLET | Refills: 0 | Status: SHIPPED | OUTPATIENT
Start: 2020-04-23 | End: 2020-07-10 | Stop reason: ALTCHOICE

## 2020-04-23 RX ORDER — AMOXICILLIN AND CLAVULANATE POTASSIUM 875; 125 MG/1; MG/1
1 TABLET, FILM COATED ORAL 2 TIMES DAILY
Qty: 20 TABLET | Refills: 0 | Status: SHIPPED | OUTPATIENT
Start: 2020-04-23 | End: 2020-05-03

## 2020-04-23 RX ORDER — PROMETHAZINE HYDROCHLORIDE AND CODEINE PHOSPHATE 6.25; 1 MG/5ML; MG/5ML
2.5 SYRUP ORAL EVERY 6 HOURS PRN
Qty: 180 ML | Refills: 0 | Status: SHIPPED | OUTPATIENT
Start: 2020-04-23 | End: 2020-07-10 | Stop reason: ALTCHOICE

## 2020-04-23 NOTE — PROGRESS NOTES
Virtual Telephone Check-In    Idalia Umanzor verbally consents to a Virtual/Telephone Check-In visit on 04/23/20. Patient understands and accepts financial responsibility for any deductible, co-insurance and/or co-pays associated with this service.     Dura

## 2020-05-06 ENCOUNTER — TELEPHONE (OUTPATIENT)
Dept: INTERNAL MEDICINE CLINIC | Facility: CLINIC | Age: 35
End: 2020-05-06

## 2020-05-07 NOTE — TELEPHONE ENCOUNTER
To MD:  The above refill request is for a controlled substance. Please review pended medication order. Print and sign for staff to fax to pharmacy or prescribe electronically.     Delon Bernal  - 2/27/2020 # 30     Last refilled - 2/12/2020 # 30

## 2020-07-10 ENCOUNTER — OFFICE VISIT (OUTPATIENT)
Dept: INTERNAL MEDICINE CLINIC | Facility: CLINIC | Age: 35
End: 2020-07-10
Payer: COMMERCIAL

## 2020-07-10 VITALS
SYSTOLIC BLOOD PRESSURE: 106 MMHG | TEMPERATURE: 99 F | WEIGHT: 136 LBS | OXYGEN SATURATION: 99 % | HEART RATE: 94 BPM | HEIGHT: 65 IN | DIASTOLIC BLOOD PRESSURE: 70 MMHG | BODY MASS INDEX: 22.66 KG/M2 | RESPIRATION RATE: 16 BRPM

## 2020-07-10 DIAGNOSIS — S20.219A CONTUSION OF RIB, UNSPECIFIED LATERALITY, INITIAL ENCOUNTER: ICD-10-CM

## 2020-07-10 DIAGNOSIS — S80.12XA CONTUSION OF LEFT CALF, INITIAL ENCOUNTER: ICD-10-CM

## 2020-07-10 DIAGNOSIS — S40.022A TRAUMATIC HEMATOMA OF LEFT UPPER ARM, INITIAL ENCOUNTER: Primary | ICD-10-CM

## 2020-07-10 DIAGNOSIS — M25.551 RIGHT HIP PAIN: ICD-10-CM

## 2020-07-10 PROCEDURE — 99214 OFFICE O/P EST MOD 30 MIN: CPT | Performed by: INTERNAL MEDICINE

## 2020-07-10 RX ORDER — ACETAMINOPHEN AND CODEINE PHOSPHATE 300; 30 MG/1; MG/1
1 TABLET ORAL EVERY 4 HOURS PRN
Qty: 20 TABLET | Refills: 1 | Status: SHIPPED | OUTPATIENT
Start: 2020-07-10 | End: 2020-11-21

## 2020-07-10 RX ORDER — IBUPROFEN 800 MG/1
800 TABLET ORAL EVERY 8 HOURS PRN
Qty: 42 TABLET | Refills: 1 | Status: SHIPPED | OUTPATIENT
Start: 2020-07-10 | End: 2020-11-21
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
## 2020-07-11 ENCOUNTER — APPOINTMENT (OUTPATIENT)
Dept: CT IMAGING | Facility: HOSPITAL | Age: 35
End: 2020-07-11
Attending: NURSE PRACTITIONER
Payer: COMMERCIAL

## 2020-07-11 ENCOUNTER — HOSPITAL ENCOUNTER (EMERGENCY)
Facility: HOSPITAL | Age: 35
Discharge: HOME | End: 2020-07-11
Payer: COMMERCIAL

## 2020-07-11 VITALS
SYSTOLIC BLOOD PRESSURE: 126 MMHG | HEART RATE: 84 BPM | DIASTOLIC BLOOD PRESSURE: 84 MMHG | WEIGHT: 130 LBS | RESPIRATION RATE: 18 BRPM | TEMPERATURE: 98 F | OXYGEN SATURATION: 98 % | BODY MASS INDEX: 22 KG/M2

## 2020-07-11 DIAGNOSIS — S30.1XXA CONTUSION OF ABDOMINAL WALL, INITIAL ENCOUNTER: Primary | ICD-10-CM

## 2020-07-11 LAB
ALBUMIN SERPL-MCNC: 4.6 G/DL (ref 3.4–5)
ALBUMIN/GLOB SERPL: 1.2 {RATIO} (ref 1–2)
ALP LIVER SERPL-CCNC: 44 U/L (ref 37–98)
ALT SERPL-CCNC: 57 U/L (ref 13–56)
ANION GAP SERPL CALC-SCNC: 6 MMOL/L (ref 0–18)
AST SERPL-CCNC: 48 U/L (ref 15–37)
B-HCG UR QL: NEGATIVE
BASOPHILS # BLD AUTO: 0.03 X10(3) UL (ref 0–0.2)
BASOPHILS NFR BLD AUTO: 0.3 %
BILIRUB SERPL-MCNC: 0.6 MG/DL (ref 0.1–2)
BUN BLD-MCNC: 8 MG/DL (ref 7–18)
BUN/CREAT SERPL: 10.7 (ref 10–20)
CALCIUM BLD-MCNC: 9.5 MG/DL (ref 8.5–10.1)
CHLORIDE SERPL-SCNC: 109 MMOL/L (ref 98–112)
CO2 SERPL-SCNC: 24 MMOL/L (ref 21–32)
CREAT BLD-MCNC: 0.75 MG/DL (ref 0.55–1.02)
DEPRECATED RDW RBC AUTO: 38.3 FL (ref 35.1–46.3)
EOSINOPHIL # BLD AUTO: 0.04 X10(3) UL (ref 0–0.7)
EOSINOPHIL NFR BLD AUTO: 0.3 %
ERYTHROCYTE [DISTWIDTH] IN BLOOD BY AUTOMATED COUNT: 12.3 % (ref 11–15)
GLOBULIN PLAS-MCNC: 3.9 G/DL (ref 2.8–4.4)
GLUCOSE BLD-MCNC: 103 MG/DL (ref 70–99)
HCT VFR BLD AUTO: 44 % (ref 35–48)
HGB BLD-MCNC: 14.8 G/DL (ref 12–16)
IMM GRANULOCYTES # BLD AUTO: 0.05 X10(3) UL (ref 0–1)
IMM GRANULOCYTES NFR BLD: 0.4 %
LIPASE SERPL-CCNC: 101 U/L (ref 73–393)
LYMPHOCYTES # BLD AUTO: 1.17 X10(3) UL (ref 1–4)
LYMPHOCYTES NFR BLD AUTO: 10.1 %
M PROTEIN MFR SERPL ELPH: 8.5 G/DL (ref 6.4–8.2)
MCH RBC QN AUTO: 28.7 PG (ref 26–34)
MCHC RBC AUTO-ENTMCNC: 33.6 G/DL (ref 31–37)
MCV RBC AUTO: 85.3 FL (ref 80–100)
MONOCYTES # BLD AUTO: 0.4 X10(3) UL (ref 0.1–1)
MONOCYTES NFR BLD AUTO: 3.5 %
NEUTROPHILS # BLD AUTO: 9.87 X10 (3) UL (ref 1.5–7.7)
NEUTROPHILS # BLD AUTO: 9.87 X10(3) UL (ref 1.5–7.7)
NEUTROPHILS NFR BLD AUTO: 85.4 %
OSMOLALITY SERPL CALC.SUM OF ELEC: 287 MOSM/KG (ref 275–295)
PLATELET # BLD AUTO: 338 10(3)UL (ref 150–450)
POTASSIUM SERPL-SCNC: 3.6 MMOL/L (ref 3.5–5.1)
RBC # BLD AUTO: 5.16 X10(6)UL (ref 3.8–5.3)
SODIUM SERPL-SCNC: 139 MMOL/L (ref 136–145)
WBC # BLD AUTO: 11.6 X10(3) UL (ref 4–11)

## 2020-07-11 PROCEDURE — 81025 URINE PREGNANCY TEST: CPT

## 2020-07-11 PROCEDURE — 74177 CT ABD & PELVIS W/CONTRAST: CPT | Performed by: NURSE PRACTITIONER

## 2020-07-11 PROCEDURE — 96361 HYDRATE IV INFUSION ADD-ON: CPT

## 2020-07-11 PROCEDURE — 96374 THER/PROPH/DIAG INJ IV PUSH: CPT

## 2020-07-11 PROCEDURE — 80053 COMPREHEN METABOLIC PANEL: CPT | Performed by: NURSE PRACTITIONER

## 2020-07-11 PROCEDURE — 99284 EMERGENCY DEPT VISIT MOD MDM: CPT

## 2020-07-11 PROCEDURE — 85025 COMPLETE CBC W/AUTO DIFF WBC: CPT | Performed by: NURSE PRACTITIONER

## 2020-07-11 PROCEDURE — 83690 ASSAY OF LIPASE: CPT | Performed by: NURSE PRACTITIONER

## 2020-07-11 RX ORDER — ONDANSETRON 2 MG/ML
INJECTION INTRAMUSCULAR; INTRAVENOUS
Status: DISCONTINUED
Start: 2020-07-11 | End: 2020-07-12

## 2020-07-11 RX ORDER — ONDANSETRON 2 MG/ML
4 INJECTION INTRAMUSCULAR; INTRAVENOUS ONCE
Status: COMPLETED | OUTPATIENT
Start: 2020-07-11 | End: 2020-07-11

## 2020-07-11 RX ORDER — ONDANSETRON 4 MG/1
4 TABLET, ORALLY DISINTEGRATING ORAL EVERY 4 HOURS PRN
Qty: 10 TABLET | Refills: 0 | Status: SHIPPED | OUTPATIENT
Start: 2020-07-11 | End: 2020-07-14

## 2020-07-11 RX ORDER — HYDROCODONE BITARTRATE AND ACETAMINOPHEN 5; 325 MG/1; MG/1
1-2 TABLET ORAL EVERY 6 HOURS PRN
Qty: 10 TABLET | Refills: 0 | Status: SHIPPED | OUTPATIENT
Start: 2020-07-11 | End: 2020-07-18
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
## 2020-07-14 ENCOUNTER — TELEPHONE (OUTPATIENT)
Dept: INTERNAL MEDICINE CLINIC | Facility: CLINIC | Age: 35
End: 2020-07-14

## 2020-07-14 RX ORDER — ONDANSETRON 4 MG/1
4 TABLET, ORALLY DISINTEGRATING ORAL EVERY 4 HOURS PRN
Qty: 30 TABLET | Refills: 0 | Status: SHIPPED | OUTPATIENT
Start: 2020-07-14 | End: 2020-07-21

## 2020-07-14 NOTE — TELEPHONE ENCOUNTER
Spoke with patient who reports she has been having to take 1 tablet of zofran every 4-5 hours for her nausea and she has taken maybe 4 tablets of Norco total since prescribed.  She has not had any vomiting since using the zofran but is still having nausea a

## 2020-07-14 NOTE — TELEPHONE ENCOUNTER
Saw Dr Nettie Huff on 7/10 and went to ER 7/11  ER gave RX for Zofran   Pt is still having trouble with nausea/keeping food down  Requests refill on zofran at [facility]    Please confirm to patient [phone]    - scheduled ER follow up w/DR Nettie Huff on 7/

## 2020-07-14 NOTE — TELEPHONE ENCOUNTER
Rx sent.   I have openings on my schedule tomorrow if she wants to see me instead of Dr. Ronny Alejandro.

## 2020-07-15 ENCOUNTER — OFFICE VISIT (OUTPATIENT)
Dept: INTERNAL MEDICINE CLINIC | Facility: CLINIC | Age: 35
End: 2020-07-15
Payer: COMMERCIAL

## 2020-07-15 VITALS
DIASTOLIC BLOOD PRESSURE: 78 MMHG | OXYGEN SATURATION: 96 % | HEART RATE: 92 BPM | WEIGHT: 133 LBS | BODY MASS INDEX: 22 KG/M2 | SYSTOLIC BLOOD PRESSURE: 108 MMHG | TEMPERATURE: 99 F

## 2020-07-15 DIAGNOSIS — S20.219A CONTUSION OF RIB, UNSPECIFIED LATERALITY, INITIAL ENCOUNTER: ICD-10-CM

## 2020-07-15 DIAGNOSIS — S40.022A TRAUMATIC HEMATOMA OF LEFT UPPER ARM, INITIAL ENCOUNTER: Primary | ICD-10-CM

## 2020-07-15 DIAGNOSIS — S80.12XA CONTUSION OF LEFT CALF, INITIAL ENCOUNTER: ICD-10-CM

## 2020-07-15 DIAGNOSIS — R11.0 NAUSEA: ICD-10-CM

## 2020-07-15 PROCEDURE — 3074F SYST BP LT 130 MM HG: CPT | Performed by: INTERNAL MEDICINE

## 2020-07-15 PROCEDURE — 3078F DIAST BP <80 MM HG: CPT | Performed by: INTERNAL MEDICINE

## 2020-07-15 PROCEDURE — 99214 OFFICE O/P EST MOD 30 MIN: CPT | Performed by: INTERNAL MEDICINE

## 2020-07-15 RX ORDER — TRAMADOL HYDROCHLORIDE 50 MG/1
50 TABLET ORAL EVERY 8 HOURS PRN
Qty: 42 TABLET | Refills: 1 | Status: SHIPPED | OUTPATIENT
Start: 2020-07-15 | End: 2020-11-21

## 2020-07-15 NOTE — PROGRESS NOTES
Karina Maza is a 30year old female. HPI:   Patient presents with:  Er F/u: Still having nausea and abdominal pain, lack of appetite. Given zofran and Norco in ER.  Tried tyenol #3 alternating with Norco for pain, neither really helped with the pain but traMADol HCl 50 MG Oral Tab Take 1 tablet (50 mg total) by mouth every 8 (eight) hours as needed for Pain. 42 tablet 1   • ondansetron 4 MG Oral Tablet Dispersible Take 1 tablet (4 mg total) by mouth every 4 (four) hours as needed for Nausea.  30 tablet 0 melena or hematochezia  All other review of systems are negative. PHYSICAL EXAM:   Blood pressure 108/78, pulse 92, temperature 98.5 °F (36.9 °C), weight 133 lb (60.3 kg), last menstrual period 06/19/2020, SpO2 96 %, not currently breastfeeding.   Co

## 2020-07-15 NOTE — TELEPHONE ENCOUNTER
Left vm informing patient that refill sent for zofran. Advised that she call our office early tomorrow morning if she would like to see Dr. Ava Bowden instead.

## 2020-08-31 NOTE — TELEPHONE ENCOUNTER
To MD:  The above refill request is for a controlled substance. Please review pended medication order. Print and sign for staff to fax to pharmacy or prescribe electronically.      Rx pended

## 2020-08-31 NOTE — TELEPHONE ENCOUNTER
Patient is calling to request a refill on her Vyvanse 50 MG. Patient states she wasn't taking it ritually due to not working, but patient has been taking it more since she started working again.      Best call back: [phone]  Pharmacy: [facility]

## 2020-11-09 ENCOUNTER — TELEPHONE (OUTPATIENT)
Dept: INTERNAL MEDICINE CLINIC | Facility: CLINIC | Age: 35
End: 2020-11-09

## 2020-11-09 NOTE — TELEPHONE ENCOUNTER
Dr. Jimenez Lowers, please advise on refill request. This was recently refilled on 11/3/20 for 30 days.

## 2020-11-12 RX ORDER — FLUTICASONE FUROATE AND VILANTEROL TRIFENATATE 100; 25 UG/1; UG/1
POWDER RESPIRATORY (INHALATION)
Refills: 1 | OUTPATIENT
Start: 2020-11-12

## 2020-11-12 NOTE — TELEPHONE ENCOUNTER
Denied Request; See pts Mychart regarding confusion. RE:Refill Request  11/11/2020 10:46 PM Reply    To: [facility] CLINICAL STAFF      From: Nadeem Neves      Created: 11/11/2020 10:46 PM        *-*-*This message has not been handled. *-*-*    I confuse

## 2020-11-12 NOTE — TELEPHONE ENCOUNTER
breo ellipta is not on active med list  The original prescription was discontinued on 7/10/2020 by Joaquim Washburn LPN for the following reason: Patient discontinued.     mychart sent

## 2021-02-18 PROBLEM — M54.2 ACUTE NECK PAIN: Status: ACTIVE | Noted: 2021-02-18

## 2021-04-14 LAB — AMB EXT TREPONEMAL ANTIBODIES: NEGATIVE

## 2021-05-21 ENCOUNTER — HOSPITAL ENCOUNTER (OUTPATIENT)
Facility: HOSPITAL | Age: 36
Setting detail: OBSERVATION
Discharge: HOME | End: 2021-05-21
Attending: OBSTETRICS & GYNECOLOGY | Admitting: OBSTETRICS & GYNECOLOGY
Payer: COMMERCIAL

## 2021-05-21 VITALS
TEMPERATURE: 99 F | DIASTOLIC BLOOD PRESSURE: 75 MMHG | RESPIRATION RATE: 18 BRPM | HEART RATE: 115 BPM | SYSTOLIC BLOOD PRESSURE: 111 MMHG

## 2021-05-21 PROBLEM — Z34.90 PREGNANCY: Status: ACTIVE | Noted: 2021-05-21

## 2021-05-21 PROCEDURE — 59025 FETAL NON-STRESS TEST: CPT

## 2021-05-21 PROCEDURE — 99214 OFFICE O/P EST MOD 30 MIN: CPT

## 2021-05-21 NOTE — TRIAGE
[facility]      Triage Note    Debby Napier Patient Status:  Observation    1985 MRN [id number]   Location [address] [facility] Attending Milagros Freedman MD   Hosp Day # 0 PCP Merlin Dike, MD [place] QuiversLinward Needles Moderate           Accelerations: Yes           Decelerations: None            Baseline: 145 BPM           Uterine Irritability: No           Contractions: Not present                                        Acoustic Stimulator: No           Nonstress Test

## 2021-05-21 NOTE — PROGRESS NOTES
Pt is a 37year old female admitted to TR1/TR1-A. Patient presents with:  Vaginal Bleeding: Pt c/o vaginal spotting (pink-brown) x 1 week. Patient denies any regular, painful contractions. + FM     Pt is  32w3d intra-uterine pregnancy.   History o

## 2021-07-06 ENCOUNTER — LAB ENCOUNTER (OUTPATIENT)
Dept: LAB | Facility: HOSPITAL | Age: 36
End: 2021-07-06
Attending: OBSTETRICS & GYNECOLOGY
Payer: COMMERCIAL

## 2021-07-06 DIAGNOSIS — Z34.80 SUPERVISION OF OTHER NORMAL PREGNANCY: ICD-10-CM

## 2021-07-06 LAB — SARS-COV-2 RNA RESP QL NAA+PROBE: NOT DETECTED

## 2021-07-07 ENCOUNTER — TELEPHONE (OUTPATIENT)
Dept: OBGYN UNIT | Facility: HOSPITAL | Age: 36
End: 2021-07-07

## 2021-07-08 ENCOUNTER — TELEPHONE (OUTPATIENT)
Dept: OBGYN UNIT | Facility: HOSPITAL | Age: 36
End: 2021-07-08

## 2021-07-08 NOTE — PROGRESS NOTES
Discussed rescheduling IOL with pt per Dr. Elis Jones. Pt rescheduled for Sunday 07/11/21 @ 0700 for IOL. Pt states understanding.

## 2021-07-11 ENCOUNTER — HOSPITAL ENCOUNTER (INPATIENT)
Facility: HOSPITAL | Age: 36
LOS: 1 days | Discharge: HOME | End: 2021-07-12
Attending: OBSTETRICS & GYNECOLOGY | Admitting: OBSTETRICS & GYNECOLOGY
Payer: COMMERCIAL

## 2021-07-11 ENCOUNTER — ANESTHESIA EVENT (OUTPATIENT)
Dept: OBGYN UNIT | Facility: HOSPITAL | Age: 36
End: 2021-07-11
Payer: COMMERCIAL

## 2021-07-11 ENCOUNTER — APPOINTMENT (OUTPATIENT)
Dept: OBGYN CLINIC | Facility: HOSPITAL | Age: 36
End: 2021-07-11
Payer: COMMERCIAL

## 2021-07-11 ENCOUNTER — ANESTHESIA (OUTPATIENT)
Dept: OBGYN UNIT | Facility: HOSPITAL | Age: 36
End: 2021-07-11
Payer: COMMERCIAL

## 2021-07-11 PROBLEM — Z34.80 SUPERVISION OF OTHER NORMAL PREGNANCY: Status: ACTIVE | Noted: 2021-07-11

## 2021-07-11 LAB
ANTIBODY SCREEN: NEGATIVE
BASOPHILS # BLD AUTO: 0.03 X10(3) UL (ref 0–0.2)
BASOPHILS NFR BLD AUTO: 0.4 %
DEPRECATED RDW RBC AUTO: 38.2 FL (ref 35.1–46.3)
EOSINOPHIL # BLD AUTO: 0.04 X10(3) UL (ref 0–0.7)
EOSINOPHIL NFR BLD AUTO: 0.5 %
ERYTHROCYTE [DISTWIDTH] IN BLOOD BY AUTOMATED COUNT: 13.4 % (ref 11–15)
HCT VFR BLD AUTO: 34.6 %
HGB BLD-MCNC: 11.1 G/DL
IMM GRANULOCYTES # BLD AUTO: 0.04 X10(3) UL (ref 0–1)
IMM GRANULOCYTES NFR BLD: 0.5 %
LYMPHOCYTES # BLD AUTO: 2.2 X10(3) UL (ref 1–4)
LYMPHOCYTES NFR BLD AUTO: 28.2 %
MCH RBC QN AUTO: 25.5 PG (ref 26–34)
MCHC RBC AUTO-ENTMCNC: 32.1 G/DL (ref 31–37)
MCV RBC AUTO: 79.4 FL
MONOCYTES # BLD AUTO: 0.63 X10(3) UL (ref 0.1–1)
MONOCYTES NFR BLD AUTO: 8.1 %
NEUTROPHILS # BLD AUTO: 4.87 X10 (3) UL (ref 1.5–7.7)
NEUTROPHILS # BLD AUTO: 4.87 X10(3) UL (ref 1.5–7.7)
NEUTROPHILS NFR BLD AUTO: 62.3 %
PLATELET # BLD AUTO: 233 10(3)UL (ref 150–450)
RBC # BLD AUTO: 4.36 X10(6)UL
RH BLOOD TYPE: POSITIVE
WBC # BLD AUTO: 7.8 X10(3) UL (ref 4–11)

## 2021-07-11 PROCEDURE — 86850 RBC ANTIBODY SCREEN: CPT | Performed by: OBSTETRICS & GYNECOLOGY

## 2021-07-11 PROCEDURE — 3E033VJ INTRODUCTION OF OTHER HORMONE INTO PERIPHERAL VEIN, PERCUTANEOUS APPROACH: ICD-10-PCS | Performed by: OBSTETRICS & GYNECOLOGY

## 2021-07-11 PROCEDURE — 86901 BLOOD TYPING SEROLOGIC RH(D): CPT | Performed by: OBSTETRICS & GYNECOLOGY

## 2021-07-11 PROCEDURE — 0KQM0ZZ REPAIR PERINEUM MUSCLE, OPEN APPROACH: ICD-10-PCS | Performed by: OBSTETRICS & GYNECOLOGY

## 2021-07-11 PROCEDURE — 85025 COMPLETE CBC W/AUTO DIFF WBC: CPT | Performed by: OBSTETRICS & GYNECOLOGY

## 2021-07-11 PROCEDURE — 86900 BLOOD TYPING SEROLOGIC ABO: CPT | Performed by: OBSTETRICS & GYNECOLOGY

## 2021-07-11 PROCEDURE — 10907ZC DRAINAGE OF AMNIOTIC FLUID, THERAPEUTIC FROM PRODUCTS OF CONCEPTION, VIA NATURAL OR ARTIFICIAL OPENING: ICD-10-PCS | Performed by: OBSTETRICS & GYNECOLOGY

## 2021-07-11 RX ORDER — SODIUM CHLORIDE, SODIUM LACTATE, POTASSIUM CHLORIDE, CALCIUM CHLORIDE 600; 310; 30; 20 MG/100ML; MG/100ML; MG/100ML; MG/100ML
INJECTION, SOLUTION INTRAVENOUS CONTINUOUS
Status: DISCONTINUED | OUTPATIENT
Start: 2021-07-11 | End: 2021-07-11 | Stop reason: HOSPADM

## 2021-07-11 RX ORDER — IBUPROFEN 600 MG/1
600 TABLET ORAL ONCE AS NEEDED
Status: DISCONTINUED | OUTPATIENT
Start: 2021-07-11 | End: 2021-07-11 | Stop reason: HOSPADM

## 2021-07-11 RX ORDER — BUPIVACAINE HYDROCHLORIDE 2.5 MG/ML
20 INJECTION, SOLUTION EPIDURAL; INFILTRATION; INTRACAUDAL ONCE
Status: DISCONTINUED | OUTPATIENT
Start: 2021-07-11 | End: 2021-07-11 | Stop reason: HOSPADM

## 2021-07-11 RX ORDER — ONDANSETRON 2 MG/ML
4 INJECTION INTRAMUSCULAR; INTRAVENOUS EVERY 6 HOURS PRN
Status: DISCONTINUED | OUTPATIENT
Start: 2021-07-11 | End: 2021-07-12

## 2021-07-11 RX ORDER — ALBUTEROL SULFATE 90 UG/1
2 AEROSOL, METERED RESPIRATORY (INHALATION) EVERY 4 HOURS PRN
Status: CANCELLED | OUTPATIENT
Start: 2021-07-11

## 2021-07-11 RX ORDER — METHYLERGONOVINE MALEATE 0.2 MG/ML
0.2 INJECTION INTRAVENOUS ONCE
Status: COMPLETED | OUTPATIENT
Start: 2021-07-11 | End: 2021-07-11

## 2021-07-11 RX ORDER — SIMETHICONE 80 MG
80 TABLET,CHEWABLE ORAL 3 TIMES DAILY PRN
Status: DISCONTINUED | OUTPATIENT
Start: 2021-07-11 | End: 2021-07-12

## 2021-07-11 RX ORDER — ACETAMINOPHEN 650 MG/1
650 SUPPOSITORY RECTAL EVERY 6 HOURS PRN
Status: DISCONTINUED | OUTPATIENT
Start: 2021-07-11 | End: 2021-07-11 | Stop reason: HOSPADM

## 2021-07-11 RX ORDER — TERBUTALINE SULFATE 1 MG/ML
0.25 INJECTION, SOLUTION SUBCUTANEOUS AS NEEDED
Status: DISCONTINUED | OUTPATIENT
Start: 2021-07-11 | End: 2021-07-11 | Stop reason: HOSPADM

## 2021-07-11 RX ORDER — BUPIVACAINE HCL/0.9 % NACL/PF 0.25 %
5 PLASTIC BAG, INJECTION (ML) EPIDURAL AS NEEDED
Status: DISCONTINUED | OUTPATIENT
Start: 2021-07-11 | End: 2021-07-12

## 2021-07-11 RX ORDER — DOCUSATE SODIUM 100 MG/1
100 CAPSULE, LIQUID FILLED ORAL
Status: DISCONTINUED | OUTPATIENT
Start: 2021-07-11 | End: 2021-07-12

## 2021-07-11 RX ORDER — DEXTROSE, SODIUM CHLORIDE, SODIUM LACTATE, POTASSIUM CHLORIDE, AND CALCIUM CHLORIDE 5; .6; .31; .03; .02 G/100ML; G/100ML; G/100ML; G/100ML; G/100ML
INJECTION, SOLUTION INTRAVENOUS CONTINUOUS
Status: DISCONTINUED | OUTPATIENT
Start: 2021-07-11 | End: 2021-07-11 | Stop reason: HOSPADM

## 2021-07-11 RX ORDER — MISOPROSTOL 200 UG/1
TABLET ORAL
Status: COMPLETED
Start: 2021-07-11 | End: 2021-07-11

## 2021-07-11 RX ORDER — DIAPER,BRIEF,INFANT-TODD,DISP
1 EACH MISCELLANEOUS EVERY 6 HOURS PRN
Status: DISCONTINUED | OUTPATIENT
Start: 2021-07-11 | End: 2021-07-12

## 2021-07-11 RX ORDER — LIDOCAINE HYDROCHLORIDE 10 MG/ML
INJECTION, SOLUTION EPIDURAL; INFILTRATION; INTRACAUDAL; PERINEURAL AS NEEDED
Status: DISCONTINUED | OUTPATIENT
Start: 2021-07-11 | End: 2021-07-11 | Stop reason: SURG

## 2021-07-11 RX ORDER — BISACODYL 10 MG
10 SUPPOSITORY, RECTAL RECTAL ONCE AS NEEDED
Status: DISCONTINUED | OUTPATIENT
Start: 2021-07-11 | End: 2021-07-12

## 2021-07-11 RX ORDER — HYDROCODONE BITARTRATE AND ACETAMINOPHEN 5; 325 MG/1; MG/1
1 TABLET ORAL EVERY 6 HOURS PRN
Status: DISCONTINUED | OUTPATIENT
Start: 2021-07-11 | End: 2021-07-12

## 2021-07-11 RX ORDER — TRANEXAMIC ACID 10 MG/ML
1000 INJECTION, SOLUTION INTRAVENOUS ONCE
Status: COMPLETED | OUTPATIENT
Start: 2021-07-11 | End: 2021-07-11

## 2021-07-11 RX ORDER — MISOPROSTOL 200 UG/1
1000 TABLET ORAL ONCE
Status: COMPLETED | OUTPATIENT
Start: 2021-07-11 | End: 2021-07-11

## 2021-07-11 RX ORDER — NALBUPHINE HCL 10 MG/ML
2.5 AMPUL (ML) INJECTION
Status: DISCONTINUED | OUTPATIENT
Start: 2021-07-11 | End: 2021-07-12

## 2021-07-11 RX ORDER — AMMONIA INHALANTS 0.04 G/.3ML
0.3 INHALANT RESPIRATORY (INHALATION) AS NEEDED
Status: DISCONTINUED | OUTPATIENT
Start: 2021-07-11 | End: 2021-07-11 | Stop reason: HOSPADM

## 2021-07-11 RX ORDER — ONDANSETRON 2 MG/ML
INJECTION INTRAMUSCULAR; INTRAVENOUS
Status: COMPLETED
Start: 2021-07-11 | End: 2021-07-11

## 2021-07-11 RX ORDER — TRISODIUM CITRATE DIHYDRATE AND CITRIC ACID MONOHYDRATE 500; 334 MG/5ML; MG/5ML
30 SOLUTION ORAL AS NEEDED
Status: DISCONTINUED | OUTPATIENT
Start: 2021-07-11 | End: 2021-07-11 | Stop reason: HOSPADM

## 2021-07-11 RX ORDER — TRANEXAMIC ACID 10 MG/ML
INJECTION, SOLUTION INTRAVENOUS
Status: COMPLETED
Start: 2021-07-11 | End: 2021-07-11

## 2021-07-11 RX ORDER — ACETAMINOPHEN 325 MG/1
650 TABLET ORAL EVERY 6 HOURS PRN
Status: DISCONTINUED | OUTPATIENT
Start: 2021-07-11 | End: 2021-07-12

## 2021-07-11 RX ORDER — ONDANSETRON 2 MG/ML
4 INJECTION INTRAMUSCULAR; INTRAVENOUS EVERY 6 HOURS PRN
Status: DISCONTINUED | OUTPATIENT
Start: 2021-07-11 | End: 2021-07-11

## 2021-07-11 RX ORDER — LIDOCAINE HYDROCHLORIDE AND EPINEPHRINE 15; 5 MG/ML; UG/ML
INJECTION, SOLUTION EPIDURAL AS NEEDED
Status: DISCONTINUED | OUTPATIENT
Start: 2021-07-11 | End: 2021-07-11 | Stop reason: SURG

## 2021-07-11 RX ORDER — IBUPROFEN 600 MG/1
600 TABLET ORAL EVERY 6 HOURS
Status: DISCONTINUED | OUTPATIENT
Start: 2021-07-11 | End: 2021-07-12

## 2021-07-11 RX ORDER — AMMONIA INHALANTS 0.04 G/.3ML
0.3 INHALANT RESPIRATORY (INHALATION) AS NEEDED
Status: DISCONTINUED | OUTPATIENT
Start: 2021-07-11 | End: 2021-07-12

## 2021-07-11 RX ADMIN — LIDOCAINE HYDROCHLORIDE 5 ML: 10 INJECTION, SOLUTION EPIDURAL; INFILTRATION; INTRACAUDAL; PERINEURAL at 09:37:00

## 2021-07-11 RX ADMIN — LIDOCAINE HYDROCHLORIDE AND EPINEPHRINE 3 ML: 15; 5 INJECTION, SOLUTION EPIDURAL at 09:45:00

## 2021-07-11 NOTE — ANESTHESIA PREPROCEDURE EVALUATION
Anesthesia PreOp Note    HPI:     Cristhian Gayle is a 37year old female who presents for preoperative consultation requested by: * No surgeons listed *    Date of Surgery: 7/11/2021    * No procedures listed *  Indication: * No pre-op diagnosis entered * 7/10/2021 at Unknown time      dextrose 5 % /lactated ringers infusion, , Intravenous, Continuous, Aurea BARRAZA MD, Last Rate: 125 mL/hr at 07/11/21 0831, New Bag at 07/11/21 0831  lactated ringers infusion, , Intravenous, Continuous, Priscilla Meyer, DIARRHEA    Family History   Problem Relation Age of Onset   • Thyroid disease Mother    • Thyroid disease Other    • Other (Other) Other         Crohns   • Cancer Paternal Grandmother      Social History    Socioeconomic History      Marital statu   Frequency of Social Gatherings with Friends and Family:       Attends Protestant Services:       Active Member of Clubs or Organizations:       Attends Club or Organization Meetings:       Marital Status:   Intimate Partner Violence:       Fear of Current

## 2021-07-11 NOTE — PLAN OF CARE
Problem: BIRTH - VAGINAL/ SECTION  Goal: Fetal and maternal status remain reassuring during the birth process  Description: INTERVENTIONS:  - Monitor vital signs  - Monitor fetal heart rate  - Monitor uterine activity  - Monitor labor progression perspectives and choices  Outcome: Progressing     Problem: Patient/Family Goals  Goal: Patient/Family Long Term Goal  Description: Patient's Long Term Goal: uncomplicated delivery    Interventions:  - induction/augmentation per protocol.   - education  -

## 2021-07-11 NOTE — L&D DELIVERY NOTE
MeryDanis [[id number]]    Labor Events     labor?: No   steroids?: None  Antibiotics received during labor?: No  Antibiotics (enter # doses in comment): none  Rupture date/time: 2021 1116     Rupture type: AROM  Fluid color: Clear  Induc [1]  Position Left [1] Occiput [1] Anterior [1]    Apgars:  1 minute:                 5 minutes:                          10 minutes:      Placenta  Date/Time of Delivery: 7/11/2021  1:23 PM   Delivery: spontaneous  Placenta to Pathology: no  Cord Gases Villeda

## 2021-07-11 NOTE — ANESTHESIA PROCEDURE NOTES
Labor Analgesia  Performed by: Chula Goldsmith MD  Authorized by: Chula Goldsmith MD       General Information and Staff    Start Time:  7/11/2021 9:35 AM  End Time:  7/11/2021 9:50 AM  Anesthesiologist:  Chula Goldsmith MD  Performed by:   Dana

## 2021-07-11 NOTE — H&P
[address] Swenson Patient Status:  Inpatient    1985 MRN [id number]   Location [address] [place] [address] Attending Ludwin Martinez MD   Hosp Day # 0 PCP Debbie Dumont MD     Date of Adm tablet, Rfl: 1, 7/10/2021 at Unknown time  Albuterol Sulfate HFA (PROAIR HFA) 108 (90 Base) MCG/ACT Inhalation Aero Soln, Inhale 2 puffs into the lungs every 4 (four) hours as needed for Wheezing., Disp: 1 Inhaler, Rfl: 6, Past Week at Unknown time  PRENAT

## 2021-07-12 VITALS
BODY MASS INDEX: 29.16 KG/M2 | WEIGHT: 175 LBS | SYSTOLIC BLOOD PRESSURE: 107 MMHG | HEART RATE: 76 BPM | RESPIRATION RATE: 16 BRPM | DIASTOLIC BLOOD PRESSURE: 61 MMHG | HEIGHT: 65 IN | OXYGEN SATURATION: 99 % | TEMPERATURE: 98 F

## 2021-07-12 LAB
BASOPHILS # BLD AUTO: 0.03 X10(3) UL (ref 0–0.2)
BASOPHILS NFR BLD AUTO: 0.4 %
DEPRECATED RDW RBC AUTO: 39.3 FL (ref 35.1–46.3)
EOSINOPHIL # BLD AUTO: 0.07 X10(3) UL (ref 0–0.7)
EOSINOPHIL NFR BLD AUTO: 0.8 %
ERYTHROCYTE [DISTWIDTH] IN BLOOD BY AUTOMATED COUNT: 13.5 % (ref 11–15)
HCT VFR BLD AUTO: 31.1 %
HGB BLD-MCNC: 9.7 G/DL
IMM GRANULOCYTES # BLD AUTO: 0.03 X10(3) UL (ref 0–1)
IMM GRANULOCYTES NFR BLD: 0.4 %
LYMPHOCYTES # BLD AUTO: 1.58 X10(3) UL (ref 1–4)
LYMPHOCYTES NFR BLD AUTO: 18.9 %
MCH RBC QN AUTO: 25.1 PG (ref 26–34)
MCHC RBC AUTO-ENTMCNC: 31.2 G/DL (ref 31–37)
MCV RBC AUTO: 80.4 FL
MONOCYTES # BLD AUTO: 0.8 X10(3) UL (ref 0.1–1)
MONOCYTES NFR BLD AUTO: 9.5 %
NEUTROPHILS # BLD AUTO: 5.87 X10 (3) UL (ref 1.5–7.7)
NEUTROPHILS # BLD AUTO: 5.87 X10(3) UL (ref 1.5–7.7)
NEUTROPHILS NFR BLD AUTO: 70 %
PLATELET # BLD AUTO: 190 10(3)UL (ref 150–450)
RBC # BLD AUTO: 3.87 X10(6)UL
WBC # BLD AUTO: 8.4 X10(3) UL (ref 4–11)

## 2021-07-12 PROCEDURE — 85025 COMPLETE CBC W/AUTO DIFF WBC: CPT | Performed by: OBSTETRICS & GYNECOLOGY

## 2021-07-12 RX ORDER — HYDROCODONE BITARTRATE AND ACETAMINOPHEN 5; 325 MG/1; MG/1
1 TABLET ORAL EVERY 6 HOURS PRN
Qty: 10 TABLET | Refills: 0 | Status: SHIPPED | OUTPATIENT
Start: 2021-07-12 | End: 2021-08-31

## 2021-07-12 RX ORDER — ACETAMINOPHEN 325 MG/1
650 TABLET ORAL EVERY 6 HOURS PRN
Qty: 60 TABLET | Refills: 0 | Status: SHIPPED | OUTPATIENT
Start: 2021-07-12

## 2021-07-12 RX ORDER — IBUPROFEN 600 MG/1
600 TABLET ORAL EVERY 6 HOURS
Qty: 60 TABLET | Refills: 0 | Status: SHIPPED | OUTPATIENT
Start: 2021-07-12

## 2021-07-12 NOTE — LACTATION NOTE
LACTATION NOTE - MOTHER      Evaluation Type: Inpatient    Problems identified  Problems identified: Knowledge deficit;Milk supply not WNL  Milk supply not WNL: Reduced (potential)    Maternal history  Maternal history: Anxiety  Other/comment: ADD, Fibromy

## 2021-07-12 NOTE — ANESTHESIA POSTPROCEDURE EVALUATION
Patient: Henrry Kerr    Procedure Summary     Date: 07/11/21 Room / Location:     Anesthesia Start: 0935 Anesthesia Stop: 9358    Procedure: LABOR ANALGESIA Diagnosis:     Scheduled Providers:  Anesthesiologist: Celestino Lundborg, MD    Anesthesia Type: e

## 2021-07-12 NOTE — PLAN OF CARE
Problem: Patient Centered Care  Goal: Patient preferences are identified and integrated in the patient's plan of care  Description: Interventions:  - What would you like us to know as we care for you?  It's a boy  - Provide timely, complete, and accurate delivery)  - DVT prophylaxis (C/S delivery)  - Surgical antibiotic prophylaxis (C/S delivery)  7/11/2021 1905 by Rashi Perez RN  Outcome: Completed  7/11/2021 1233 by Rashi Perez RN  Outcome: Progressing     Problem: PAIN - ADULT  Goal: Verbalizes/displa

## 2021-07-12 NOTE — PROGRESS NOTES
[facility]    OB/GYNE Progress Note      Kristy Hence Patient Status:  Inpatient    1985 MRN [id number]   Location [facility] 3SE Attending Jeremy Starks MD   Hosp Day # 1 PCP Taina Rowland MD       Assessment/Plan 07/12/2021         No results found.     Celeste Mota MD  7/12/2021  7:25 AM

## 2021-07-12 NOTE — DISCHARGE SUMMARY
[facility]    Discharge Summary    Crispin Fitzpatrick Patient Status:  Inpatient    1985 MRN [id number]   Location [facility] 3SE Attending Ludwin Martinez MD   Hosp Day # 1 PCP Debbie Dumont MD     Date of Admission: 20

## 2021-07-12 NOTE — PLAN OF CARE
Problem: Patient Centered Care  Goal: Patient preferences are identified and integrated in the patient's plan of care  Description: Interventions:  - What would you like us to know as we care for you?  It's a boy  - Provide timely, complete, and accurate ambulation and provide assistance as needed. - Assess and monitor emotional status and provide social service/psych resources as needed. - Utilize standard precautions and use personal protective equipment as indicated.  Ensure aseptic care of all intrave supply with medication/procedure interruptions  Description: INTERVENTIONS:  - Review techniques for milk expression (breast pumping). - Provide pumping equipment/supplies, instructions, and assistance until it is safe to breastfeed infant.   Outcome: Pro

## 2021-07-12 NOTE — PLAN OF CARE
Problem: Patient Centered Care  Goal: Patient preferences are identified and integrated in the patient's plan of care  Description: Interventions:  - What would you like us to know as we care for you?  It's a boy  - Provide timely, complete, and accurate healing of incision/episiotomy/laceration, and assess for signs and symptoms of infection and hematoma. - Assess bladder function and monitor for bladder distention.  - Provide/instruct/assist with pericare as needed. - Provide VTE prophylaxis as needed. Provide pumping equipment/supplies, instructions and assistance, as needed. - Encourage rooming-in and breast feeding on demand.  - Encourage skin-to-skin contact. - Provide LC support as needed. - Assess for and manage engorgement.   - Provide breast fe

## 2021-07-12 NOTE — PROGRESS NOTES
Patient up to wheelchair with assist x 2. Patient transferred to mother/baby room 352 per wheelchair in stable condition with baby and personal belongings. Accompanied by significant other and staff. Report given to [employer].

## 2021-07-12 NOTE — PLAN OF CARE
Problem: Patient Centered Care  Goal: Patient preferences are identified and integrated in the patient's plan of care  Description: Interventions:  - What would you like us to know as we care for you?  It's a boy  - Provide timely, complete, and accurate course  Description: INTERVENTIONS:  - Assess and monitor vital signs and lab values. - Assess fundus and lochia. - Provide ice/sitz baths for perineum discomfort.   - Monitor healing of incision/episiotomy/laceration, and assess for signs and symptoms of substances incompatible with breast feeding.  - Assess and monitor for signs of nipple pain/trauma. - Instruct and provide assistance with proper latch. - Review techniques for milk expression (breast pumping) and storage of breast milk.  Provide pumping

## 2021-07-26 ENCOUNTER — TELEPHONE (OUTPATIENT)
Dept: OBGYN UNIT | Facility: HOSPITAL | Age: 36
End: 2021-07-26

## 2021-07-26 NOTE — PROGRESS NOTES
Reviewed self and infant care with mom, she verbalizes understanding of instruction reviewed. Encouraged to follow up with MD's as directed with questions/concerns. Patient states she is feeling depressed. Denies thoughts of harm to self or others. Patient informed that Dr. Michoacano Villafana office would be notified. Patient in agreement with plan.   Report phoned into Светлана Baltazar RN, at [address].

## 2021-08-31 ENCOUNTER — OFFICE VISIT (OUTPATIENT)
Dept: INTERNAL MEDICINE CLINIC | Facility: CLINIC | Age: 36
End: 2021-08-31
Payer: COMMERCIAL

## 2021-08-31 VITALS
HEIGHT: 65 IN | DIASTOLIC BLOOD PRESSURE: 80 MMHG | OXYGEN SATURATION: 98 % | HEART RATE: 77 BPM | BODY MASS INDEX: 24.83 KG/M2 | WEIGHT: 149 LBS | SYSTOLIC BLOOD PRESSURE: 100 MMHG | TEMPERATURE: 99 F

## 2021-08-31 DIAGNOSIS — B35.4 TINEA CORPORIS: Primary | ICD-10-CM

## 2021-08-31 DIAGNOSIS — Z00.00 ROUTINE HEALTH MAINTENANCE: ICD-10-CM

## 2021-08-31 DIAGNOSIS — F98.8 ATTENTION DEFICIT DISORDER, UNSPECIFIED HYPERACTIVITY PRESENCE: ICD-10-CM

## 2021-08-31 PROCEDURE — 3008F BODY MASS INDEX DOCD: CPT | Performed by: INTERNAL MEDICINE

## 2021-08-31 PROCEDURE — 3074F SYST BP LT 130 MM HG: CPT | Performed by: INTERNAL MEDICINE

## 2021-08-31 PROCEDURE — 99213 OFFICE O/P EST LOW 20 MIN: CPT | Performed by: INTERNAL MEDICINE

## 2021-08-31 PROCEDURE — 3079F DIAST BP 80-89 MM HG: CPT | Performed by: INTERNAL MEDICINE

## 2021-08-31 RX ORDER — ALPRAZOLAM 1 MG/1
1 TABLET ORAL
COMMUNITY
Start: 2021-08-27

## 2021-08-31 RX ORDER — CLOTRIMAZOLE AND BETAMETHASONE DIPROPIONATE 10; .64 MG/G; MG/G
1 CREAM TOPICAL 2 TIMES DAILY
Qty: 45 G | Refills: 0 | Status: SHIPPED | OUTPATIENT
Start: 2021-08-31 | End: 2021-10-05 | Stop reason: ALTCHOICE

## 2021-08-31 NOTE — PROGRESS NOTES
Idalia Umanzor is a 31year old female. Patient presents with:  Rash: L armpit x 2 weeks     HPI:     Rash left armpit for past 2 weeks. Slightly tender. No new deodorants    Also asking for RF on vyvanse as she is having difficulty focusing.   Just had her t • Meningitis 11/2017    Pt reported      Social History:  Social History    Tobacco Use      Smoking status: Never Smoker      Smokeless tobacco: Never Used    Vaping Use      Vaping Use: Never used    Alcohol use: Not Currently      Comment: once per mo

## 2021-09-07 ENCOUNTER — HOSPITAL ENCOUNTER (OUTPATIENT)
Age: 36
Discharge: HOME | End: 2021-09-07
Payer: COMMERCIAL

## 2021-09-07 ENCOUNTER — PATIENT MESSAGE (OUTPATIENT)
Dept: INTERNAL MEDICINE CLINIC | Facility: CLINIC | Age: 36
End: 2021-09-07

## 2021-09-07 ENCOUNTER — TELEPHONE (OUTPATIENT)
Dept: INTERNAL MEDICINE CLINIC | Facility: CLINIC | Age: 36
End: 2021-09-07

## 2021-09-07 VITALS
DIASTOLIC BLOOD PRESSURE: 78 MMHG | HEART RATE: 88 BPM | RESPIRATION RATE: 16 BRPM | TEMPERATURE: 98 F | SYSTOLIC BLOOD PRESSURE: 125 MMHG | OXYGEN SATURATION: 100 %

## 2021-09-07 DIAGNOSIS — R21 RASH OF BODY: Primary | ICD-10-CM

## 2021-09-07 PROCEDURE — 99213 OFFICE O/P EST LOW 20 MIN: CPT | Performed by: NURSE PRACTITIONER

## 2021-09-07 RX ORDER — PERMETHRIN 50 MG/G
1 CREAM TOPICAL ONCE
Qty: 60 G | Refills: 0 | Status: SHIPPED | OUTPATIENT
Start: 2021-09-07 | End: 2021-09-07

## 2021-09-07 RX ORDER — PREDNISONE 20 MG/1
40 TABLET ORAL DAILY
Qty: 10 TABLET | Refills: 0 | Status: SHIPPED | OUTPATIENT
Start: 2021-09-07 | End: 2021-09-12

## 2021-09-07 NOTE — TELEPHONE ENCOUNTER
Patient was called per request below. No answer. A message was left for patient to call back. Patient can schedule an appointment in the office per Dr Donna Caceres message below.

## 2021-09-07 NOTE — TELEPHONE ENCOUNTER
Pt. States she has red dots all over he body,  on her legs, torso, her buttock area and  rash on her left arm. She isn't sure if it's scabies and is concerned because she has a 5 week old and wants to be seen in office please advise ph.  #  [phone]  Routed high to clinical

## 2021-09-07 NOTE — TELEPHONE ENCOUNTER
To Dr. Chemo Johnson to please advise in PCP absence-----  Pt reports after 8/31/21 OV, pt developed red spots on legs, across abdomen, bilateral buttocks, left arm. Pt reports these spots appear as \"spider bites\" and one of the spots appear open- no oozing, pt states it appears that it might has pus underneath. Denies any sanguineous drainage. Pt denies fever or chills. Reports spots are itchy. Pt took benadryl, reports improvement in itchiness. PCP not in office today, please advise if you'd like to see patient in office or if pt should be referred to [address] [place] [address].

## 2021-09-07 NOTE — ED PROVIDER NOTES
Patient Seen in: [facility]      History   Patient presents with:  Rash Skin Problem    Stated Complaint: RASH    HPI/Subjective:   HPI    This is a 81-year-old female presenting with rash.   Patient states, she was seen by her primary care pr 08/26/2021 (Exact Date)   SpO2 100%         Physical Exam  Vitals and nursing note reviewed. Constitutional:       Appearance: Normal appearance. HENT:      Head: Normocephalic.       Right Ear: External ear normal.      Left Ear: External ear normal. paperwork. Patient acknowledged understanding discharge instructions.                          Disposition and Plan     Clinical Impression:  Rash of body  (primary encounter diagnosis)     Disposition:  Discharge  9/7/2021  1:09 pm    Follow-up:  Jovanny Burgos

## 2021-09-07 NOTE — TELEPHONE ENCOUNTER
From: Nadeem Neves  To: Aileen Garcia MD  Sent: 9/7/2021 12:12 PM CDT  Subject: Non-Urgent Medical Question    I think I have scabies -- please see attached photos. My  had scabies 10 years ago and also suspects this is early symptoms of scabies.

## 2021-09-08 ENCOUNTER — APPOINTMENT (OUTPATIENT)
Dept: URBAN - METROPOLITAN AREA CLINIC 321 | Age: 36
Setting detail: DERMATOLOGY
End: 2021-09-08

## 2021-09-08 DIAGNOSIS — L29.8 OTHER PRURITUS: ICD-10-CM

## 2021-09-08 DIAGNOSIS — T07XXXA INSECT BITE, NONVENOMOUS, OF OTHER, MULTIPLE, AND UNSPECIFIED SITES, WITHOUT MENTION OF INFECTION: ICD-10-CM

## 2021-09-08 PROBLEM — L30.9 DERMATITIS, UNSPECIFIED: Status: ACTIVE | Noted: 2021-09-08

## 2021-09-08 PROCEDURE — OTHER COUNSELING: OTHER

## 2021-09-08 PROCEDURE — 11104 PUNCH BX SKIN SINGLE LESION: CPT

## 2021-09-08 PROCEDURE — OTHER BIOPSY BY PUNCH METHOD: OTHER

## 2021-09-08 PROCEDURE — 99203 OFFICE O/P NEW LOW 30 MIN: CPT | Mod: 25

## 2021-09-08 PROCEDURE — OTHER PRESCRIPTION: OTHER

## 2021-09-08 RX ORDER — HYDROCORTISONE 25 MG/G
CREAM TOPICAL
Qty: 30 | Refills: 0 | Status: ERX | COMMUNITY
Start: 2021-09-08

## 2021-09-08 ASSESSMENT — LOCATION DETAILED DESCRIPTION DERM
LOCATION DETAILED: LEFT DISTAL RADIAL DORSAL FOREARM
LOCATION DETAILED: RIGHT PROXIMAL DORSAL FOREARM
LOCATION DETAILED: RIGHT ANTERIOR PROXIMAL THIGH
LOCATION DETAILED: PERIUMBILICAL SKIN
LOCATION DETAILED: LEFT LATERAL ABDOMEN
LOCATION DETAILED: RIGHT INFRAMAMMARY CREASE (INNER QUADRANT)
LOCATION DETAILED: RIGHT ELBOW
LOCATION DETAILED: LEFT RIB CAGE

## 2021-09-08 ASSESSMENT — LOCATION SIMPLE DESCRIPTION DERM
LOCATION SIMPLE: LEFT FOREARM
LOCATION SIMPLE: RIGHT BREAST
LOCATION SIMPLE: RIGHT FOREARM
LOCATION SIMPLE: ABDOMEN
LOCATION SIMPLE: RIGHT ELBOW
LOCATION SIMPLE: RIGHT THIGH

## 2021-09-08 ASSESSMENT — LOCATION ZONE DERM
LOCATION ZONE: TRUNK
LOCATION ZONE: ARM
LOCATION ZONE: LEG

## 2021-09-08 NOTE — PROCEDURE: BIOPSY BY PUNCH METHOD
X Size Of Lesion In Cm (Optional): 0
Detail Level: Detailed
Render Path Notes In Note?: Yes
Anesthesia Volume In Cc (Will Not Render If 0): 0.5
Suture Removal: 14 days
Wound Care: Petrolatum
Biopsy Type: H and E
Bill 87494 For Specimen Handling/Conveyance To Laboratory?: no
Hemostasis: None
Home Suture Removal Text: Patient was provided a home suture removal kit and will remove their sutures at home.  If they have any questions or difficulties they will call the office.
Epidermal Sutures: 4-0 Nylon
Information: Selecting Yes will display possible errors in your note based on the variables you have selected. This validation is only offered as a suggestion for you. PLEASE NOTE THAT THE VALIDATION TEXT WILL BE REMOVED WHEN YOU FINALIZE YOUR NOTE. IF YOU WANT TO FAX A PRELIMINARY NOTE YOU WILL NEED TO TOGGLE THIS TO 'NO' IF YOU DO NOT WANT IT IN YOUR FAXED NOTE.
Body Location Override (Optional - Billing Will Still Be Based On Selected Body Map Location If Applicable): L lateral abdomen
Consent: Written consent was obtained and risks were reviewed including but not limited to scarring, infection, bleeding, scabbing, incomplete removal, nerve damage and allergy to anesthesia.
Anesthesia Type: 1% lidocaine with epinephrine
Notification Instructions: Patient will be notified of biopsy results. However, patient instructed to call the office if not contacted within 2 weeks.
Billing Type: Third-Party Bill
Punch Size In Mm: 3
Dressing: bandage
Post-Care Instructions: I reviewed with the patient in detail post-care instructions. Patient is to keep the biopsy site dry overnight, and then apply vaseline once-twice daily until healed.

## 2021-09-20 ENCOUNTER — APPOINTMENT (OUTPATIENT)
Dept: URBAN - METROPOLITAN AREA CLINIC 321 | Age: 36
Setting detail: DERMATOLOGY
End: 2021-09-20

## 2021-09-20 DIAGNOSIS — L70.0 ACNE VULGARIS: ICD-10-CM

## 2021-09-20 DIAGNOSIS — L82.1 OTHER SEBORRHEIC KERATOSIS: ICD-10-CM

## 2021-09-20 DIAGNOSIS — L27.0 GENERALIZED SKIN ERUPTION DUE TO DRUGS AND MEDICAMENTS TAKEN INTERNALLY: ICD-10-CM

## 2021-09-20 DIAGNOSIS — L72.0 EPIDERMAL CYST: ICD-10-CM

## 2021-09-20 PROCEDURE — OTHER PRESCRIPTION: OTHER

## 2021-09-20 PROCEDURE — OTHER COUNSELING: OTHER

## 2021-09-20 PROCEDURE — 99214 OFFICE O/P EST MOD 30 MIN: CPT

## 2021-09-20 RX ORDER — TRETIONIN 0.5 MG/G
CREAM TOPICAL
Qty: 45 | Refills: 0 | Status: ERX | COMMUNITY
Start: 2021-09-20

## 2021-09-20 ASSESSMENT — LOCATION DETAILED DESCRIPTION DERM
LOCATION DETAILED: RIGHT MEDIAL FOREHEAD
LOCATION DETAILED: LEFT CENTRAL TEMPLE
LOCATION DETAILED: LEFT MEDIAL FOREHEAD

## 2021-09-20 ASSESSMENT — LOCATION SIMPLE DESCRIPTION DERM
LOCATION SIMPLE: RIGHT FOREHEAD
LOCATION SIMPLE: LEFT TEMPLE
LOCATION SIMPLE: LEFT FOREHEAD

## 2021-09-20 ASSESSMENT — LOCATION ZONE DERM: LOCATION ZONE: FACE

## 2021-09-20 NOTE — PROCEDURE: COUNSELING
Sarecycline Counseling: Patient advised regarding possible photosensitivity and discoloration of the teeth, skin, lips, tongue and gums.  Patient instructed to avoid sunlight, if possible.  When exposed to sunlight, patients should wear protective clothing, sunglasses, and sunscreen.  The patient was instructed to call the office immediately if the following severe adverse effects occur:  hearing changes, easy bruising/bleeding, severe headache, or vision changes.  The patient verbalized understanding of the proper use and possible adverse effects of sarecycline.  All of the patient's questions and concerns were addressed.
Benzoyl Peroxide Counseling: Patient counseled that medicine may cause skin irritation and bleach clothing.  In the event of skin irritation, the patient was advised to reduce the amount of the drug applied or use it less frequently.   The patient verbalized understanding of the proper use and possible adverse effects of benzoyl peroxide.  All of the patient's questions and concerns were addressed.
Topical Clindamycin Counseling: Patient counseled that this medication may cause skin irritation or allergic reactions.  In the event of skin irritation, the patient was advised to reduce the amount of the drug applied or use it less frequently.   The patient verbalized understanding of the proper use and possible adverse effects of clindamycin.  All of the patient's questions and concerns were addressed.
Detail Level: Simple
Minocycline Pregnancy And Lactation Text: This medication is Pregnancy Category D and not consider safe during pregnancy. It is also excreted in breast milk.
Include Pregnancy/Lactation Warning?: No
Birth Control Pills Pregnancy And Lactation Text: This medication should be avoided if pregnant and for the first 30 days post-partum.
Tetracycline Counseling: Patient counseled regarding possible photosensitivity and increased risk for sunburn.  Patient instructed to avoid sunlight, if possible.  When exposed to sunlight, patients should wear protective clothing, sunglasses, and sunscreen.  The patient was instructed to call the office immediately if the following severe adverse effects occur:  hearing changes, easy bruising/bleeding, severe headache, or vision changes.  The patient verbalized understanding of the proper use and possible adverse effects of tetracycline.  All of the patient's questions and concerns were addressed. Patient understands to avoid pregnancy while on therapy due to potential birth defects.
Benzoyl Peroxide Pregnancy And Lactation Text: This medication is Pregnancy Category C. It is unknown if benzoyl peroxide is excreted in breast milk.
Dapsone Counseling: I discussed with the patient the risks of dapsone including but not limited to hemolytic anemia, agranulocytosis, rashes, methemoglobinemia, kidney failure, peripheral neuropathy, headaches, GI upset, and liver toxicity.  Patients who start dapsone require monitoring including baseline LFTs and weekly CBCs for the first month, then every month thereafter.  The patient verbalized understanding of the proper use and possible adverse effects of dapsone.  All of the patient's questions and concerns were addressed.
High Dose Vitamin A Counseling: Side effects reviewed, pt to contact office should one occur.
Erythromycin Counseling:  I discussed with the patient the risks of erythromycin including but not limited to GI upset, allergic reaction, drug rash, diarrhea, increase in liver enzymes, and yeast infections.
Dapsone Pregnancy And Lactation Text: This medication is Pregnancy Category C and is not considered safe during pregnancy or breast feeding.
Azithromycin Counseling:  I discussed with the patient the risks of azithromycin including but not limited to GI upset, allergic reaction, drug rash, diarrhea, and yeast infections.
Birth Control Pills Counseling: Birth Control Pill Counseling: I discussed with the patient the potential side effects of OCPs including but not limited to increased risk of stroke, heart attack, thrombophlebitis, deep venous thrombosis, hepatic adenomas, breast changes, GI upset, headaches, and depression.  The patient verbalized understanding of the proper use and possible adverse effects of OCPs. All of the patient's questions and concerns were addressed.
Azithromycin Pregnancy And Lactation Text: This medication is considered safe during pregnancy and is also secreted in breast milk.
Spironolactone Counseling: Patient advised regarding risks of diarrhea, abdominal pain, hyperkalemia, birth defects (for female patients), liver toxicity and renal toxicity. The patient may need blood work to monitor liver and kidney function and potassium levels while on therapy. The patient verbalized understanding of the proper use and possible adverse effects of spironolactone.  All of the patient's questions and concerns were addressed.
Doxycycline Counseling:  Patient counseled regarding possible photosensitivity and increased risk for sunburn.  Patient instructed to avoid sunlight, if possible.  When exposed to sunlight, patients should wear protective clothing, sunglasses, and sunscreen.  The patient was instructed to call the office immediately if the following severe adverse effects occur:  hearing changes, easy bruising/bleeding, severe headache, or vision changes.  The patient verbalized understanding of the proper use and possible adverse effects of doxycycline.  All of the patient's questions and concerns were addressed.
Patient Specific Counseling (Will Not Stick From Patient To Patient): Will access when patient has no makeup.
Topical Sulfur Applications Counseling: Topical Sulfur Counseling: Patient counseled that this medication may cause skin irritation or allergic reactions.  In the event of skin irritation, the patient was advised to reduce the amount of the drug applied or use it less frequently.   The patient verbalized understanding of the proper use and possible adverse effects of topical sulfur application.  All of the patient's questions and concerns were addressed.
Topical Clindamycin Pregnancy And Lactation Text: This medication is Pregnancy Category B and is considered safe during pregnancy. It is unknown if it is excreted in breast milk.
Patient Specific Counseling (Will Not Stick From Patient To Patient): Pt stopped multiple supplements, already improving.
Bactrim Pregnancy And Lactation Text: This medication is Pregnancy Category D and is known to cause fetal risk.  It is also excreted in breast milk.
High Dose Vitamin A Pregnancy And Lactation Text: High dose vitamin A therapy is contraindicated during pregnancy and breast feeding.
Doxycycline Pregnancy And Lactation Text: This medication is Pregnancy Category D and not consider safe during pregnancy. It is also excreted in breast milk but is considered safe for shorter treatment courses.
Isotretinoin Counseling: Patient should get monthly blood tests, not donate blood, not drive at night if vision affected, not share medication, and not undergo elective surgery for 6 months after tx completed. Side effects reviewed, pt to contact office should one occur.
Erythromycin Pregnancy And Lactation Text: This medication is Pregnancy Category B and is considered safe during pregnancy. It is also excreted in breast milk.
Tazorac Pregnancy And Lactation Text: This medication is not safe during pregnancy. It is unknown if this medication is excreted in breast milk.
Isotretinoin Pregnancy And Lactation Text: This medication is Pregnancy Category X and is considered extremely dangerous during pregnancy. It is unknown if it is excreted in breast milk.
Tazorac Counseling:  Patient advised that medication is irritating and drying.  Patient may need to apply sparingly and wash off after an hour before eventually leaving it on overnight.  The patient verbalized understanding of the proper use and possible adverse effects of tazorac.  All of the patient's questions and concerns were addressed.
Topical Retinoid Pregnancy And Lactation Text: This medication is Pregnancy Category C. It is unknown if this medication is excreted in breast milk.
Detail Level: Zone
Detail Level: Detailed
Bactrim Counseling:  I discussed with the patient the risks of sulfa antibiotics including but not limited to GI upset, allergic reaction, drug rash, diarrhea, dizziness, photosensitivity, and yeast infections.  Rarely, more serious reactions can occur including but not limited to aplastic anemia, agranulocytosis, methemoglobinemia, blood dyscrasias, liver or kidney failure, lung infiltrates or desquamative/blistering drug rashes.
Topical Retinoid counseling:  Patient advised to apply a pea-sized amount only at bedtime and wait 30 minutes after washing their face before applying.  If too drying, patient may add a non-comedogenic moisturizer. The patient verbalized understanding of the proper use and possible adverse effects of retinoids.  All of the patient's questions and concerns were addressed.
Minocycline Counseling: Patient advised regarding possible photosensitivity and discoloration of the teeth, skin, lips, tongue and gums.  Patient instructed to avoid sunlight, if possible.  When exposed to sunlight, patients should wear protective clothing, sunglasses, and sunscreen.  The patient was instructed to call the office immediately if the following severe adverse effects occur:  hearing changes, easy bruising/bleeding, severe headache, or vision changes.  The patient verbalized understanding of the proper use and possible adverse effects of minocycline.  All of the patient's questions and concerns were addressed.
Topical Sulfur Applications Pregnancy And Lactation Text: This medication is Pregnancy Category C and has an unknown safety profile during pregnancy. It is unknown if this topical medication is excreted in breast milk.
Spironolactone Pregnancy And Lactation Text: This medication can cause feminization of the male fetus and should be avoided during pregnancy. The active metabolite is also found in breast milk.

## 2021-10-05 ENCOUNTER — OFFICE VISIT (OUTPATIENT)
Dept: INTERNAL MEDICINE CLINIC | Facility: CLINIC | Age: 36
End: 2021-10-05
Payer: COMMERCIAL

## 2021-10-05 VITALS
HEIGHT: 65 IN | DIASTOLIC BLOOD PRESSURE: 80 MMHG | SYSTOLIC BLOOD PRESSURE: 110 MMHG | OXYGEN SATURATION: 98 % | TEMPERATURE: 99 F | WEIGHT: 144 LBS | HEART RATE: 86 BPM | BODY MASS INDEX: 23.99 KG/M2

## 2021-10-05 DIAGNOSIS — Z00.00 ROUTINE HEALTH MAINTENANCE: ICD-10-CM

## 2021-10-05 DIAGNOSIS — F98.8 ATTENTION DEFICIT DISORDER, UNSPECIFIED HYPERACTIVITY PRESENCE: ICD-10-CM

## 2021-10-05 DIAGNOSIS — J45.20 MILD INTERMITTENT ASTHMA WITHOUT COMPLICATION: Primary | ICD-10-CM

## 2021-10-05 PROCEDURE — 99395 PREV VISIT EST AGE 18-39: CPT | Performed by: INTERNAL MEDICINE

## 2021-10-05 PROCEDURE — 90471 IMMUNIZATION ADMIN: CPT | Performed by: INTERNAL MEDICINE

## 2021-10-05 PROCEDURE — 90686 IIV4 VACC NO PRSV 0.5 ML IM: CPT | Performed by: INTERNAL MEDICINE

## 2021-10-05 PROCEDURE — 3008F BODY MASS INDEX DOCD: CPT | Performed by: INTERNAL MEDICINE

## 2021-10-05 PROCEDURE — 3074F SYST BP LT 130 MM HG: CPT | Performed by: INTERNAL MEDICINE

## 2021-10-05 PROCEDURE — 3079F DIAST BP 80-89 MM HG: CPT | Performed by: INTERNAL MEDICINE

## 2021-10-05 NOTE — PROGRESS NOTES
Mary Silverman is a 31year old female. Patient presents with:  Physical: s/p vaginal delivery 3 months ago. postpardum depression. See's Dr. Steven Marina and therapist once weekly. No SI or HI ideation.        HPI:   Mary Silverman is a 31year old female who present • Extrinsic asthma, unspecified    • Lipid screening 2012   • Meningitis 2017    Pt reported      Past Surgical History:   Procedure Laterality Date   • ADENOIDECTOMY      dR. Aburto   •   2017   •   2016      Family Hi normal; HPV neg  Now seeing gyne Dr. Tricia Segura. Had Achieve X vaccines. Had Tdap 5/2021. Flu shot today 10/5/21.   Check fasting labs  Cont exercise, healthy diet    Mild intermittent asthma without complication  Uses albuterol occasionally    Atte

## 2021-11-03 NOTE — TELEPHONE ENCOUNTER
Pt has active rx as [facility] fill date 11/1. Too soon for refill    Current refill request refused due to refill is either a duplicate request or has active refills at the pharmacy. Check previous templates.     Requested Prescriptions     Pending Pres

## 2021-12-21 ENCOUNTER — TELEPHONE (OUTPATIENT)
Dept: INTERNAL MEDICINE CLINIC | Facility: CLINIC | Age: 36
End: 2021-12-21

## 2022-02-15 ENCOUNTER — LAB ENCOUNTER (OUTPATIENT)
Dept: LAB | Age: 37
End: 2022-02-15
Attending: INTERNAL MEDICINE
Payer: COMMERCIAL

## 2022-02-15 ENCOUNTER — APPOINTMENT (OUTPATIENT)
Dept: CT IMAGING | Facility: HOSPITAL | Age: 37
End: 2022-02-15
Attending: NURSE PRACTITIONER
Payer: COMMERCIAL

## 2022-02-15 ENCOUNTER — PATIENT MESSAGE (OUTPATIENT)
Dept: INTERNAL MEDICINE CLINIC | Facility: CLINIC | Age: 37
End: 2022-02-15

## 2022-02-15 ENCOUNTER — HOSPITAL ENCOUNTER (EMERGENCY)
Facility: HOSPITAL | Age: 37
Discharge: HOME | End: 2022-02-15
Payer: COMMERCIAL

## 2022-02-15 ENCOUNTER — OFFICE VISIT (OUTPATIENT)
Dept: INTERNAL MEDICINE CLINIC | Facility: CLINIC | Age: 37
End: 2022-02-15
Payer: COMMERCIAL

## 2022-02-15 VITALS
BODY MASS INDEX: 21.86 KG/M2 | OXYGEN SATURATION: 98 % | HEART RATE: 90 BPM | SYSTOLIC BLOOD PRESSURE: 100 MMHG | HEIGHT: 65 IN | WEIGHT: 131.19 LBS | TEMPERATURE: 99 F | DIASTOLIC BLOOD PRESSURE: 70 MMHG

## 2022-02-15 VITALS
HEIGHT: 65 IN | HEART RATE: 78 BPM | DIASTOLIC BLOOD PRESSURE: 66 MMHG | TEMPERATURE: 98 F | RESPIRATION RATE: 18 BRPM | OXYGEN SATURATION: 99 % | SYSTOLIC BLOOD PRESSURE: 136 MMHG | WEIGHT: 131.5 LBS | BODY MASS INDEX: 21.91 KG/M2

## 2022-02-15 DIAGNOSIS — N30.00 ACUTE CYSTITIS WITHOUT HEMATURIA: Primary | ICD-10-CM

## 2022-02-15 DIAGNOSIS — R14.0 ABDOMINAL DISTENSION: ICD-10-CM

## 2022-02-15 DIAGNOSIS — R10.9 ABDOMINAL PAIN, ACUTE: ICD-10-CM

## 2022-02-15 DIAGNOSIS — R39.9 LOWER URINARY TRACT SYMPTOMS: Primary | ICD-10-CM

## 2022-02-15 LAB
ALBUMIN SERPL-MCNC: 4.3 G/DL (ref 3.4–5)
ALBUMIN/GLOB SERPL: 1.1 {RATIO} (ref 1–2)
ALP LIVER SERPL-CCNC: 44 U/L
ALT SERPL-CCNC: 23 U/L
ANION GAP SERPL CALC-SCNC: 5 MMOL/L (ref 0–18)
AST SERPL-CCNC: 12 U/L (ref 15–37)
B-HCG UR QL: NEGATIVE
BASOPHILS # BLD AUTO: 0.05 X10(3) UL (ref 0–0.2)
BASOPHILS NFR BLD AUTO: 0.7 %
BILIRUB SERPL-MCNC: 0.3 MG/DL (ref 0.1–2)
BILIRUB UR QL: NEGATIVE
BUN BLD-MCNC: 4 MG/DL (ref 7–18)
BUN/CREAT SERPL: 4.8 (ref 10–20)
CALCIUM BLD-MCNC: 10.1 MG/DL (ref 8.5–10.1)
CHLORIDE SERPL-SCNC: 106 MMOL/L (ref 98–112)
CO2 SERPL-SCNC: 26 MMOL/L (ref 21–32)
COLOR UR: YELLOW
CREAT BLD-MCNC: 0.84 MG/DL
DEPRECATED RDW RBC AUTO: 39.8 FL (ref 35.1–46.3)
EOSINOPHIL # BLD AUTO: 0.1 X10(3) UL (ref 0–0.7)
EOSINOPHIL NFR BLD AUTO: 1.4 %
ERYTHROCYTE [DISTWIDTH] IN BLOOD BY AUTOMATED COUNT: 13.1 % (ref 11–15)
GLOBULIN PLAS-MCNC: 3.9 G/DL (ref 2.8–4.4)
GLUCOSE BLD-MCNC: 97 MG/DL (ref 70–99)
GLUCOSE UR-MCNC: NEGATIVE MG/DL
HCT VFR BLD AUTO: 43.6 %
HGB BLD-MCNC: 14.1 G/DL
HGB UR QL STRIP.AUTO: NEGATIVE
IMM GRANULOCYTES # BLD AUTO: 0.01 X10(3) UL (ref 0–1)
IMM GRANULOCYTES NFR BLD: 0.1 %
KETONES UR-MCNC: NEGATIVE MG/DL
LIPASE SERPL-CCNC: 142 U/L (ref 73–393)
LYMPHOCYTES # BLD AUTO: 3.11 X10(3) UL (ref 1–4)
LYMPHOCYTES NFR BLD AUTO: 42.4 %
MCH RBC QN AUTO: 27 PG (ref 26–34)
MCHC RBC AUTO-ENTMCNC: 32.3 G/DL (ref 31–37)
MCV RBC AUTO: 83.5 FL
MONOCYTES # BLD AUTO: 0.42 X10(3) UL (ref 0.1–1)
MONOCYTES NFR BLD AUTO: 5.7 %
NEUTROPHILS # BLD AUTO: 3.64 X10 (3) UL (ref 1.5–7.7)
NEUTROPHILS # BLD AUTO: 3.64 X10(3) UL (ref 1.5–7.7)
NEUTROPHILS NFR BLD AUTO: 49.7 %
NITRITE UR QL STRIP.AUTO: POSITIVE
OSMOLALITY SERPL CALC.SUM OF ELEC: 281 MOSM/KG (ref 275–295)
PLATELET # BLD AUTO: 300 10(3)UL (ref 150–450)
POTASSIUM SERPL-SCNC: 3.7 MMOL/L (ref 3.5–5.1)
PROT SERPL-MCNC: 8.2 G/DL (ref 6.4–8.2)
PROT UR-MCNC: NEGATIVE MG/DL
RBC # BLD AUTO: 5.22 X10(6)UL
SODIUM SERPL-SCNC: 137 MMOL/L (ref 136–145)
SP GR UR STRIP: 1 (ref 1–1.03)
UROBILINOGEN UR STRIP-ACNC: 4
WBC # BLD AUTO: 7.3 X10(3) UL (ref 4–11)

## 2022-02-15 PROCEDURE — 3008F BODY MASS INDEX DOCD: CPT | Performed by: INTERNAL MEDICINE

## 2022-02-15 PROCEDURE — 83690 ASSAY OF LIPASE: CPT

## 2022-02-15 PROCEDURE — 87186 SC STD MICRODIL/AGAR DIL: CPT

## 2022-02-15 PROCEDURE — 99214 OFFICE O/P EST MOD 30 MIN: CPT | Performed by: INTERNAL MEDICINE

## 2022-02-15 PROCEDURE — 81025 URINE PREGNANCY TEST: CPT

## 2022-02-15 PROCEDURE — 81001 URINALYSIS AUTO W/SCOPE: CPT

## 2022-02-15 PROCEDURE — 3074F SYST BP LT 130 MM HG: CPT | Performed by: INTERNAL MEDICINE

## 2022-02-15 PROCEDURE — 80053 COMPREHEN METABOLIC PANEL: CPT

## 2022-02-15 PROCEDURE — 96374 THER/PROPH/DIAG INJ IV PUSH: CPT

## 2022-02-15 PROCEDURE — 87088 URINE BACTERIA CULTURE: CPT

## 2022-02-15 PROCEDURE — 3078F DIAST BP <80 MM HG: CPT | Performed by: INTERNAL MEDICINE

## 2022-02-15 PROCEDURE — 87086 URINE CULTURE/COLONY COUNT: CPT

## 2022-02-15 PROCEDURE — 74177 CT ABD & PELVIS W/CONTRAST: CPT | Performed by: NURSE PRACTITIONER

## 2022-02-15 PROCEDURE — 99284 EMERGENCY DEPT VISIT MOD MDM: CPT

## 2022-02-15 PROCEDURE — 85025 COMPLETE CBC W/AUTO DIFF WBC: CPT

## 2022-02-15 RX ORDER — ONDANSETRON 4 MG/1
4 TABLET, ORALLY DISINTEGRATING ORAL EVERY 4 HOURS PRN
Qty: 10 TABLET | Refills: 0 | Status: SHIPPED | OUTPATIENT
Start: 2022-02-15 | End: 2022-02-15

## 2022-02-15 RX ORDER — CEPHALEXIN 500 MG/1
500 CAPSULE ORAL 2 TIMES DAILY
Qty: 14 CAPSULE | Refills: 0 | Status: SHIPPED | OUTPATIENT
Start: 2022-02-15 | End: 2022-02-22

## 2022-02-15 RX ORDER — ONDANSETRON 4 MG/1
4 TABLET, ORALLY DISINTEGRATING ORAL EVERY 4 HOURS PRN
Qty: 10 TABLET | Refills: 0 | Status: SHIPPED | OUTPATIENT
Start: 2022-02-15 | End: 2022-02-22

## 2022-02-15 RX ORDER — CEPHALEXIN 500 MG/1
500 CAPSULE ORAL 2 TIMES DAILY
Qty: 14 CAPSULE | Refills: 0 | Status: SHIPPED | OUTPATIENT
Start: 2022-02-15 | End: 2022-02-15

## 2022-02-15 RX ORDER — ONDANSETRON 2 MG/ML
4 INJECTION INTRAMUSCULAR; INTRAVENOUS ONCE
Status: COMPLETED | OUTPATIENT
Start: 2022-02-15 | End: 2022-02-15

## 2022-02-15 NOTE — TELEPHONE ENCOUNTER
Pt. Called back following up on previous message. She states her lower abdomen is completely distended now with a dull ache. She has been drinking cranberry juice and is using AZO with no relief. Asking if she can be seen today? Please advise. She can be reached at [phone].

## 2022-02-15 NOTE — TELEPHONE ENCOUNTER
From: Veryl Route  To: Jean-Paul Orta MD  Sent: 2/15/2022 8:41 AM CST  Subject: UTI    Good morning Dr. Imelda Bray,  I have been having trouble urinating for the past two days, I have urgency, but not much comes out. I have tried AZO, cranberry juice and cranberry extract 450 mg. Can you please advise me on whether I should continue this or try an alternative method? Thank you!   Kenan Master  [phone]

## 2022-02-15 NOTE — ED INITIAL ASSESSMENT (HPI)
Patient to ed via private vehicle patient co of abd pain with n/v +upper quadrant abd pain +urinary urgency but reports retention for patient +bloated +diarrhea

## 2022-02-15 NOTE — TELEPHONE ENCOUNTER
UTI symptoms:    []Frequency  [x]Urgency  [x]Pain/burning  []Blood in urine  []Low back pain  []Flank pain  []Fevers/chills  []Odor  []Confusion    NOTES:    Please advise - called patient who has above SX for 2 days - to DR. Ranjit Pandya

## 2022-02-15 NOTE — TELEPHONE ENCOUNTER
Spoke to patient. Offered appointment with associate.  Pt scheduled with Dr. Jessie Gusman today at 2:30pm.

## 2022-02-15 NOTE — ED QUICK NOTES
Reports UTI like symptoms and self treated at home with cranberry juice and AZO. States the AZO did not help with her symptoms. Was sent to ED by PMD for R/O appy.  denies abd tenderness

## 2022-02-23 ENCOUNTER — PATIENT MESSAGE (OUTPATIENT)
Dept: INTERNAL MEDICINE CLINIC | Facility: CLINIC | Age: 37
End: 2022-02-23

## 2022-02-23 NOTE — TELEPHONE ENCOUNTER
From: Pearl Anthony  To: Karl Amato MD  Sent: 2/23/2022 4:03 PM CST  Subject: Tiny fat umbilical hernia    Good afternoon Dr. Karena Victoria,    I wanted to know if I needed to follow up on my recent CT scan that said I had tiny fat containing umbilical hernia. Thank you!     Jamie Sanchez

## 2022-02-24 ENCOUNTER — TELEPHONE (OUTPATIENT)
Dept: INTERNAL MEDICINE CLINIC | Facility: CLINIC | Age: 37
End: 2022-02-24

## 2022-02-24 ENCOUNTER — OFFICE VISIT (OUTPATIENT)
Dept: INTERNAL MEDICINE CLINIC | Facility: CLINIC | Age: 37
End: 2022-02-24
Payer: COMMERCIAL

## 2022-02-24 VITALS
TEMPERATURE: 99 F | HEART RATE: 88 BPM | BODY MASS INDEX: 21.69 KG/M2 | SYSTOLIC BLOOD PRESSURE: 112 MMHG | DIASTOLIC BLOOD PRESSURE: 72 MMHG | HEIGHT: 65 IN | OXYGEN SATURATION: 98 % | WEIGHT: 130.19 LBS

## 2022-02-24 DIAGNOSIS — R14.0 ABDOMINAL DISTENSION: Primary | ICD-10-CM

## 2022-02-24 PROCEDURE — 99214 OFFICE O/P EST MOD 30 MIN: CPT | Performed by: INTERNAL MEDICINE

## 2022-02-24 PROCEDURE — 3074F SYST BP LT 130 MM HG: CPT | Performed by: INTERNAL MEDICINE

## 2022-02-24 PROCEDURE — 3078F DIAST BP <80 MM HG: CPT | Performed by: INTERNAL MEDICINE

## 2022-02-24 PROCEDURE — 3008F BODY MASS INDEX DOCD: CPT | Performed by: INTERNAL MEDICINE

## 2022-02-24 RX ORDER — GARLIC EXTRACT 500 MG
1 CAPSULE ORAL DAILY
COMMUNITY
End: 2023-06-28

## 2022-02-24 RX ORDER — MULTIVITAMIN WITH IRON
250 TABLET ORAL DAILY
COMMUNITY

## 2022-02-24 NOTE — TELEPHONE ENCOUNTER
To Dr. Bartolo Eagle to please advise----    Please see mychart response below and mychart encounter from 2/23. Spoke to pt who reports for about a week, her abdomen has been larger than normal. Reports, \"it looks like i'm 3 months pregnant\". States that the abdomen does not feel hard. She was initially vomiting last week, but has not vomited for about 1 week now. Does have some nausea. Reports constant pressure around her belly button, but not directly at the belly button. Ratest the discomfort/pressure a 5 out of 10. Reports she has had some constipation. Had one episode of diarrhea this morning. Advised if any worsening symptoms to call back or seek ER evaluation. Pt verbalized understanding.

## 2022-02-24 NOTE — TELEPHONE ENCOUNTER
FYI to Dr. Yaakov Goldberg---    Spoke to pt who reports she is already taking align probiotic daily and grapeseed extract for gut health (added to med module). Reports she is nauseated, but denies emesis since last Tuesday. Admits to intermittent constipation and diarrhea with last BM being this morning liquid stool x2. Denies black/tarry stool, fever, blood in stool, abdominal pain (does admit to discomfort from bloating). Denies all covid symptoms and exposure. Pt reports she has not been paying attention to symptoms in regards to eating. Appt scheduled with you tonight at 1730.

## 2022-02-24 NOTE — TELEPHONE ENCOUNTER
Looks like pt had a w/u in the ED on 2/15 -- CBC, CMP normal.  CT a/p normal.  Was found to have a UTI (E.coli, pansensitive) treated with Keflex x 7 days. Not sure of the cause of her abdominal bloating. Recommend trial of a probiotic (eg Align). Is she having any diarrhea? Any more vomiting? Worse with eating?   Happy to see her in the office

## 2022-03-23 ENCOUNTER — TELEPHONE (OUTPATIENT)
Dept: INTERNAL MEDICINE CLINIC | Facility: CLINIC | Age: 37
End: 2022-03-23

## 2022-03-23 NOTE — TELEPHONE ENCOUNTER
To MD:  The above refill request is for a controlled substance. Please review pended medication order. Print and sign for staff to fax to pharmacy or prescribe electronically. Last office visit:2/24/2  Last time refill sent and quantity/refills:2/21/22 #  30    To DR. Carlotta Cotto

## 2022-03-23 NOTE — TELEPHONE ENCOUNTER
Pt is calling and would like a refill for the following:    VYVANSE     [facility] [place]    Unable to find in medication list

## 2022-04-29 ENCOUNTER — HOSPITAL ENCOUNTER (OUTPATIENT)
Age: 37
Discharge: HOME | End: 2022-04-29
Payer: COMMERCIAL

## 2022-04-29 ENCOUNTER — APPOINTMENT (OUTPATIENT)
Dept: GENERAL RADIOLOGY | Age: 37
End: 2022-04-29
Attending: NURSE PRACTITIONER
Payer: COMMERCIAL

## 2022-04-29 VITALS
SYSTOLIC BLOOD PRESSURE: 117 MMHG | HEIGHT: 65 IN | DIASTOLIC BLOOD PRESSURE: 79 MMHG | OXYGEN SATURATION: 100 % | TEMPERATURE: 98 F | WEIGHT: 125 LBS | RESPIRATION RATE: 20 BRPM | BODY MASS INDEX: 20.83 KG/M2 | HEART RATE: 101 BPM

## 2022-04-29 DIAGNOSIS — J98.8 VIRAL RESPIRATORY ILLNESS: Primary | ICD-10-CM

## 2022-04-29 DIAGNOSIS — Z20.822 ENCOUNTER FOR LABORATORY TESTING FOR COVID-19 VIRUS: ICD-10-CM

## 2022-04-29 DIAGNOSIS — B97.89 VIRAL RESPIRATORY ILLNESS: Primary | ICD-10-CM

## 2022-04-29 DIAGNOSIS — J98.01 BRONCHOSPASM: ICD-10-CM

## 2022-04-29 DIAGNOSIS — J45.901 EXACERBATION OF ASTHMA, UNSPECIFIED ASTHMA SEVERITY, UNSPECIFIED WHETHER PERSISTENT: ICD-10-CM

## 2022-04-29 LAB
POCT INFLUENZA A: NEGATIVE
POCT INFLUENZA B: NEGATIVE
S PYO AG THROAT QL: NEGATIVE
SARS-COV-2 RNA RESP QL NAA+PROBE: NOT DETECTED

## 2022-04-29 PROCEDURE — 94640 AIRWAY INHALATION TREATMENT: CPT | Performed by: NURSE PRACTITIONER

## 2022-04-29 PROCEDURE — 87880 STREP A ASSAY W/OPTIC: CPT | Performed by: NURSE PRACTITIONER

## 2022-04-29 PROCEDURE — 87502 INFLUENZA DNA AMP PROBE: CPT

## 2022-04-29 PROCEDURE — 71046 X-RAY EXAM CHEST 2 VIEWS: CPT | Performed by: NURSE PRACTITIONER

## 2022-04-29 PROCEDURE — 99214 OFFICE O/P EST MOD 30 MIN: CPT | Performed by: NURSE PRACTITIONER

## 2022-04-29 PROCEDURE — U0002 COVID-19 LAB TEST NON-CDC: HCPCS

## 2022-04-29 RX ORDER — ALBUTEROL SULFATE 2.5 MG/3ML
10 SOLUTION RESPIRATORY (INHALATION) CONTINUOUS
Status: DISCONTINUED | OUTPATIENT
Start: 2022-04-29 | End: 2022-04-29

## 2022-04-29 RX ORDER — ALBUTEROL SULFATE 2.5 MG/3ML
2.5 SOLUTION RESPIRATORY (INHALATION) EVERY 4 HOURS PRN
Qty: 30 EACH | Refills: 0 | Status: SHIPPED | OUTPATIENT
Start: 2022-04-29 | End: 2022-05-29

## 2022-04-29 RX ORDER — BENZONATATE 100 MG/1
100 CAPSULE ORAL 3 TIMES DAILY PRN
Qty: 20 CAPSULE | Refills: 0 | Status: SHIPPED | OUTPATIENT
Start: 2022-04-29 | End: 2022-05-06

## 2022-04-29 RX ORDER — PREDNISONE 20 MG/1
40 TABLET ORAL ONCE
Status: COMPLETED | OUTPATIENT
Start: 2022-04-29 | End: 2022-04-29

## 2022-04-29 RX ORDER — PREDNISONE 20 MG/1
40 TABLET ORAL DAILY
Qty: 10 TABLET | Refills: 0 | Status: SHIPPED | OUTPATIENT
Start: 2022-04-29 | End: 2022-05-04

## 2022-04-29 NOTE — ED INITIAL ASSESSMENT (HPI)
Difficulty breathing with cough for the last 24 hours  Chest and back pain with breathing  Patient used albuterol inhaler and delsym for symptom management without relief. Patient with a hx of asthma; last exacerbation a year ago.

## 2022-05-11 ENCOUNTER — TELEPHONE (OUTPATIENT)
Dept: INTERNAL MEDICINE CLINIC | Facility: CLINIC | Age: 37
End: 2022-05-11

## 2022-05-12 NOTE — TELEPHONE ENCOUNTER
To MD:  The above refill request is for a controlled substance. Please review pended medication order. Print and sign for staff to fax to pharmacy or prescribe electronically.     Last office visit: 2/24/22  Last time refill sent and quantity/refills: 3/23/22 qty 30 plus 0

## 2022-05-23 ENCOUNTER — TELEPHONE (OUTPATIENT)
Dept: INTERNAL MEDICINE CLINIC | Facility: CLINIC | Age: 37
End: 2022-05-23

## 2022-05-23 NOTE — TELEPHONE ENCOUNTER
Respiratory infection triage:    Fever:  [x]  No fever  []  Fever>100.4    Cough:  [] Tight cough  [] Cough with exertion  [x] Dry cough   [] Sputum production,    Breathing:  [x] Mild shortness of breath interfering with activity - at rest and walking around - hard to catch breath. [x] Wheezing - with coughing. Does not feel like \"normal asthma wheeze\"  [] Pain with deep breathing  [x] Using inhaler - several times a day. doesn't seem to be helping. Used nebs more in the beginning, planning on taking neb today. Other symptoms:  [x] Sore throat  [] Difficulty swallowing  [x] Nasal drainage/congestion  [x] Sinus congestion/pressure  [x] Ear pain -  bilateral  [] Body aches  [] Poor appetite  [] Loss of sense of smell   [] Loss of sense of taste  []Conjunctivitis? [] Any recent travel? [] Any sick contacts? 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 ADDITIONAL NOTES:  To Dr. Jessie Gusman on call to please advise----    Spoke to pt, who reports she was seen at [facility] about 3 weeks ago. See  visit note from 4/29. Pt was negative for strep, covid, and flu at that time. CXR negative. Pt was sent home with nebs, albuterol inhaler, prednisone, and benzonatate. Pt completed the prednisone and reports her symptoms became more manageable, but never resolved. Now reports about 2 days ago, she \"developed a cold\". Reporting above symptoms. Reporting \"it is hard to catch my breath at times\". SOB with rest and activity. Using inhaler several times a day. Has tried delsym, dayquil, benadryl. Benzonatate did not help. Reporting her chest hurts with coughing. Due to SOB, wheezing, and prolonged symptoms, RN advised pt be evaluated at ER or at least UC. Pt declining at this time. Pt tearful at the end of phone call, states \"I have 3 kids and my  just left me, I can't get to the ER\". Pt reports she did feel better with the prednisone, but felt as though it wasn't enough.  Reports Dr. Ranjit Pandya has prescribed a steroid inhaler in the past. RN Stressed the importance of ER evaluation for SOB and above symptoms to rule out pneumonia, hypoxia, etc. Pt continues to decline. Notified patient that we will route this message to the doctor and see what their recommendations would be. In the meantime, if anything worsens, they were advised to call back or seek emergent evaluation.

## 2022-05-23 NOTE — TELEPHONE ENCOUNTER
Called patient.   Left message on her phone that I will call back a little bit later to see what I can do to help

## 2022-05-23 NOTE — TELEPHONE ENCOUNTER
Tried calling patient.   Left message on her phone that I am available to talk and help with her symptoms at [phone]

## 2022-05-23 NOTE — TELEPHONE ENCOUNTER
Patient is calling today after visiting urgent care 2-3 weeks ago for shortness of breath. Patient was tested for covid which came back negative. Patient states she is still experiencing a bad sore throat, chest congestion and pain, and cough, not coughing up anything. Patient states she was sent home from urgent car with a nebulizer and prescription for steroids. Patient states Dr Lana Marino usually gives her an inhaler which also helps.  # [phone]  Pharmacy: [facility]     Patient was made aware that Dr Lana Marino is out of the office until tomorrow. Patient would like another physician to look into this request in her absence.

## 2022-07-21 ENCOUNTER — TELEPHONE (OUTPATIENT)
Dept: INTERNAL MEDICINE CLINIC | Facility: CLINIC | Age: 37
End: 2022-07-21

## 2022-07-21 NOTE — TELEPHONE ENCOUNTER
To MD:  The above refill request is for a controlled substance. Please review pended medication order. Print and sign for staff to fax to pharmacy or prescribe electronically.     Last office visit:2/24/2022   Last time refill sent and quantity/refills: 5/12/2022 #30/0

## 2022-08-01 LAB — AMB EXT COVID-19 RESULT: DETECTED

## 2022-08-02 ENCOUNTER — TELEPHONE (OUTPATIENT)
Dept: INTERNAL MEDICINE CLINIC | Facility: CLINIC | Age: 37
End: 2022-08-02

## 2022-08-02 NOTE — TELEPHONE ENCOUNTER
Pt called    She tested positive for Covid yesterday    Asks if she can get a prescription     Pt has a cough  Very sore throat  Ears are bothering her/cannot really hear  Chills     [phone]

## 2022-09-13 ENCOUNTER — TELEPHONE (OUTPATIENT)
Dept: INTERNAL MEDICINE CLINIC | Facility: CLINIC | Age: 37
End: 2022-09-13

## 2022-09-14 NOTE — TELEPHONE ENCOUNTER
To MD:  The above refill request is for a controlled substance. Please review pended medication order. Print and sign for staff to fax to pharmacy or prescribe electronically.     Last office visit: 10/5/21  Last time refill sent and quantity/refills: 7/21/22 #30

## 2022-11-02 ENCOUNTER — TELEPHONE (OUTPATIENT)
Dept: INTERNAL MEDICINE CLINIC | Facility: CLINIC | Age: 37
End: 2022-11-02

## 2022-11-02 NOTE — TELEPHONE ENCOUNTER
Pt called, requesting refill for:  Vyvanse   [facility] [place] [facility]  Pt is out of medication   Tasked to [employer]

## 2022-11-02 NOTE — TELEPHONE ENCOUNTER
To MD:  The above refill request is for a controlled substance. Please review pended medication order. Print and sign for staff to fax to pharmacy or prescribe electronically. Last office visit: acute visit 2/24/22, physical 10/5/21  Last time refill sent and quantity/refills: 9/14/22 #30   Per [place]  last dispensed 9/14/22    Also to FD to please call patient to schedule annual physical, thanks!

## 2022-11-28 ENCOUNTER — TELEPHONE (OUTPATIENT)
Dept: INTERNAL MEDICINE CLINIC | Facility: CLINIC | Age: 37
End: 2022-11-28

## 2022-11-28 NOTE — TELEPHONE ENCOUNTER
Patient left a voicemail she has been nauseated for a few days. Patient is calling to request to speak to someone to discuss anti nausea medication.     Please call patient [phone]

## 2022-11-28 NOTE — TELEPHONE ENCOUNTER
Please call patient, she left voicemail message  Pt has been nauseous for a few days  Requesting anti-nausea medication  Please call patient to discuss/advise  tasked to nursing

## 2023-01-03 NOTE — TELEPHONE ENCOUNTER
COURTNEY Ordonez, Lorne Hightower Just now (3:39 PM)     [address]  HI Vladimir Scott,   Our nursing team has been unsuccessful in their several attempts to reach you regarding your recent episode of nausea. Did this subside? Please provide us with a condition update.    Thanks

## 2023-06-28 ENCOUNTER — OFFICE VISIT (OUTPATIENT)
Dept: INTERNAL MEDICINE CLINIC | Facility: CLINIC | Age: 38
End: 2023-06-28

## 2023-06-28 VITALS
TEMPERATURE: 99 F | DIASTOLIC BLOOD PRESSURE: 76 MMHG | HEIGHT: 65 IN | OXYGEN SATURATION: 99 % | SYSTOLIC BLOOD PRESSURE: 118 MMHG | BODY MASS INDEX: 20.33 KG/M2 | HEART RATE: 98 BPM | WEIGHT: 122 LBS

## 2023-06-28 DIAGNOSIS — N63.21 MASS OF UPPER OUTER QUADRANT OF LEFT BREAST: Primary | ICD-10-CM

## 2023-06-28 PROCEDURE — 3078F DIAST BP <80 MM HG: CPT | Performed by: INTERNAL MEDICINE

## 2023-06-28 PROCEDURE — 3008F BODY MASS INDEX DOCD: CPT | Performed by: INTERNAL MEDICINE

## 2023-06-28 PROCEDURE — 99213 OFFICE O/P EST LOW 20 MIN: CPT | Performed by: INTERNAL MEDICINE

## 2023-06-28 PROCEDURE — 3074F SYST BP LT 130 MM HG: CPT | Performed by: INTERNAL MEDICINE

## 2023-06-28 RX ORDER — OCTISALATE, AVOBENZONE, HOMOSALATE, AND OCTOCRYLENE 29.4; 29.4; 49; 25.48 MG/ML; MG/ML; MG/ML; MG/ML
1 LOTION TOPICAL DAILY
COMMUNITY

## 2023-07-03 ENCOUNTER — HOSPITAL ENCOUNTER (OUTPATIENT)
Dept: MAMMOGRAPHY | Facility: HOSPITAL | Age: 38
Discharge: HOME | End: 2023-07-03
Attending: INTERNAL MEDICINE
Payer: COMMERCIAL

## 2023-07-03 ENCOUNTER — HOSPITAL ENCOUNTER (OUTPATIENT)
Dept: ULTRASOUND IMAGING | Facility: HOSPITAL | Age: 38
Discharge: HOME | End: 2023-07-03
Attending: INTERNAL MEDICINE
Payer: COMMERCIAL

## 2023-07-03 DIAGNOSIS — N63.21 MASS OF UPPER OUTER QUADRANT OF LEFT BREAST: ICD-10-CM

## 2023-07-03 PROCEDURE — 76642 ULTRASOUND BREAST LIMITED: CPT | Performed by: INTERNAL MEDICINE

## 2023-07-03 PROCEDURE — 77062 BREAST TOMOSYNTHESIS BI: CPT | Performed by: INTERNAL MEDICINE

## 2023-07-03 PROCEDURE — 77066 DX MAMMO INCL CAD BI: CPT | Performed by: INTERNAL MEDICINE

## 2023-09-07 ENCOUNTER — ANESTHESIA (OUTPATIENT)
Dept: SURGERY | Facility: HOSPITAL | Age: 38
End: 2023-09-07
Payer: COMMERCIAL

## 2023-09-07 ENCOUNTER — ANESTHESIA EVENT (OUTPATIENT)
Dept: SURGERY | Facility: HOSPITAL | Age: 38
End: 2023-09-07
Payer: COMMERCIAL

## 2023-09-07 ENCOUNTER — HOSPITAL ENCOUNTER (OUTPATIENT)
Facility: HOSPITAL | Age: 38
Setting detail: HOSPITAL OUTPATIENT SURGERY
Discharge: HOME | End: 2023-09-07
Attending: SURGERY | Admitting: SURGERY
Payer: COMMERCIAL

## 2023-09-07 VITALS
HEIGHT: 65 IN | SYSTOLIC BLOOD PRESSURE: 130 MMHG | RESPIRATION RATE: 18 BRPM | OXYGEN SATURATION: 100 % | BODY MASS INDEX: 21.41 KG/M2 | DIASTOLIC BLOOD PRESSURE: 76 MMHG | WEIGHT: 128.5 LBS | HEART RATE: 82 BPM | TEMPERATURE: 98 F

## 2023-09-07 LAB — B-HCG UR QL: NEGATIVE

## 2023-09-07 PROCEDURE — 8E0W4CZ ROBOTIC ASSISTED PROCEDURE OF TRUNK REGION, PERCUTANEOUS ENDOSCOPIC APPROACH: ICD-10-PCS | Performed by: SURGERY

## 2023-09-07 PROCEDURE — 0WUF4JZ SUPPLEMENT ABDOMINAL WALL WITH SYNTHETIC SUBSTITUTE, PERCUTANEOUS ENDOSCOPIC APPROACH: ICD-10-PCS | Performed by: SURGERY

## 2023-09-07 PROCEDURE — 81025 URINE PREGNANCY TEST: CPT

## 2023-09-07 DEVICE — SYNECOR SURG MESH CIRCLE 12CM: Type: IMPLANTABLE DEVICE | Site: ABDOMEN | Status: FUNCTIONAL

## 2023-09-07 RX ORDER — ONDANSETRON 2 MG/ML
INJECTION INTRAMUSCULAR; INTRAVENOUS AS NEEDED
Status: DISCONTINUED | OUTPATIENT
Start: 2023-09-07 | End: 2023-09-07 | Stop reason: SURG

## 2023-09-07 RX ORDER — ACETAMINOPHEN 500 MG
1000 TABLET ORAL ONCE
Status: COMPLETED | OUTPATIENT
Start: 2023-09-07 | End: 2023-09-07

## 2023-09-07 RX ORDER — LIDOCAINE HYDROCHLORIDE 10 MG/ML
INJECTION, SOLUTION EPIDURAL; INFILTRATION; INTRACAUDAL; PERINEURAL AS NEEDED
Status: DISCONTINUED | OUTPATIENT
Start: 2023-09-07 | End: 2023-09-07 | Stop reason: SURG

## 2023-09-07 RX ORDER — ROCURONIUM BROMIDE 10 MG/ML
INJECTION, SOLUTION INTRAVENOUS AS NEEDED
Status: DISCONTINUED | OUTPATIENT
Start: 2023-09-07 | End: 2023-09-07 | Stop reason: SURG

## 2023-09-07 RX ORDER — HYDROMORPHONE HYDROCHLORIDE 1 MG/ML
0.2 INJECTION, SOLUTION INTRAMUSCULAR; INTRAVENOUS; SUBCUTANEOUS EVERY 5 MIN PRN
Status: DISCONTINUED | OUTPATIENT
Start: 2023-09-07 | End: 2023-09-07

## 2023-09-07 RX ORDER — SODIUM CHLORIDE, SODIUM LACTATE, POTASSIUM CHLORIDE, CALCIUM CHLORIDE 600; 310; 30; 20 MG/100ML; MG/100ML; MG/100ML; MG/100ML
INJECTION, SOLUTION INTRAVENOUS CONTINUOUS
Status: DISCONTINUED | OUTPATIENT
Start: 2023-09-07 | End: 2023-09-07

## 2023-09-07 RX ORDER — TRAMADOL HYDROCHLORIDE 50 MG/1
50 TABLET ORAL EVERY 6 HOURS PRN
Qty: 10 TABLET | Refills: 0 | Status: SHIPPED | OUTPATIENT
Start: 2023-09-07

## 2023-09-07 RX ORDER — MORPHINE SULFATE 10 MG/ML
6 INJECTION, SOLUTION INTRAMUSCULAR; INTRAVENOUS EVERY 10 MIN PRN
Status: DISCONTINUED | OUTPATIENT
Start: 2023-09-07 | End: 2023-09-07

## 2023-09-07 RX ORDER — MORPHINE SULFATE 4 MG/ML
2 INJECTION, SOLUTION INTRAMUSCULAR; INTRAVENOUS EVERY 10 MIN PRN
Status: DISCONTINUED | OUTPATIENT
Start: 2023-09-07 | End: 2023-09-07

## 2023-09-07 RX ORDER — DEXAMETHASONE SODIUM PHOSPHATE 4 MG/ML
VIAL (ML) INJECTION AS NEEDED
Status: DISCONTINUED | OUTPATIENT
Start: 2023-09-07 | End: 2023-09-07 | Stop reason: SURG

## 2023-09-07 RX ORDER — MIDAZOLAM HYDROCHLORIDE 1 MG/ML
INJECTION INTRAMUSCULAR; INTRAVENOUS AS NEEDED
Status: DISCONTINUED | OUTPATIENT
Start: 2023-09-07 | End: 2023-09-07 | Stop reason: SURG

## 2023-09-07 RX ORDER — BUPIVACAINE HYDROCHLORIDE AND EPINEPHRINE 2.5; 5 MG/ML; UG/ML
INJECTION, SOLUTION INFILTRATION; PERINEURAL AS NEEDED
Status: DISCONTINUED | OUTPATIENT
Start: 2023-09-07 | End: 2023-09-07 | Stop reason: HOSPADM

## 2023-09-07 RX ORDER — HYDROMORPHONE HYDROCHLORIDE 1 MG/ML
0.6 INJECTION, SOLUTION INTRAMUSCULAR; INTRAVENOUS; SUBCUTANEOUS EVERY 5 MIN PRN
Status: DISCONTINUED | OUTPATIENT
Start: 2023-09-07 | End: 2023-09-07

## 2023-09-07 RX ORDER — ONDANSETRON 4 MG/1
4 TABLET, FILM COATED ORAL EVERY 8 HOURS PRN
Qty: 10 TABLET | Refills: 0 | Status: SHIPPED | OUTPATIENT
Start: 2023-09-07

## 2023-09-07 RX ORDER — KETOROLAC TROMETHAMINE 30 MG/ML
INJECTION, SOLUTION INTRAMUSCULAR; INTRAVENOUS AS NEEDED
Status: DISCONTINUED | OUTPATIENT
Start: 2023-09-07 | End: 2023-09-07 | Stop reason: SURG

## 2023-09-07 RX ORDER — NALOXONE HYDROCHLORIDE 0.4 MG/ML
80 INJECTION, SOLUTION INTRAMUSCULAR; INTRAVENOUS; SUBCUTANEOUS AS NEEDED
Status: DISCONTINUED | OUTPATIENT
Start: 2023-09-07 | End: 2023-09-07

## 2023-09-07 RX ORDER — PROCHLORPERAZINE EDISYLATE 5 MG/ML
5 INJECTION INTRAMUSCULAR; INTRAVENOUS EVERY 8 HOURS PRN
Status: DISCONTINUED | OUTPATIENT
Start: 2023-09-07 | End: 2023-09-07

## 2023-09-07 RX ORDER — ONDANSETRON 2 MG/ML
4 INJECTION INTRAMUSCULAR; INTRAVENOUS EVERY 6 HOURS PRN
Status: DISCONTINUED | OUTPATIENT
Start: 2023-09-07 | End: 2023-09-07

## 2023-09-07 RX ORDER — MORPHINE SULFATE 4 MG/ML
4 INJECTION, SOLUTION INTRAMUSCULAR; INTRAVENOUS EVERY 10 MIN PRN
Status: DISCONTINUED | OUTPATIENT
Start: 2023-09-07 | End: 2023-09-07

## 2023-09-07 RX ORDER — HYDROMORPHONE HYDROCHLORIDE 1 MG/ML
0.4 INJECTION, SOLUTION INTRAMUSCULAR; INTRAVENOUS; SUBCUTANEOUS EVERY 5 MIN PRN
Status: DISCONTINUED | OUTPATIENT
Start: 2023-09-07 | End: 2023-09-07

## 2023-09-07 RX ORDER — DOCUSATE SODIUM 100 MG/1
100 CAPSULE, LIQUID FILLED ORAL 2 TIMES DAILY
Qty: 14 CAPSULE | Refills: 0 | Status: SHIPPED | OUTPATIENT
Start: 2023-09-07 | End: 2023-09-14

## 2023-09-07 RX ORDER — KETAMINE HYDROCHLORIDE 50 MG/ML
INJECTION, SOLUTION, CONCENTRATE INTRAMUSCULAR; INTRAVENOUS AS NEEDED
Status: DISCONTINUED | OUTPATIENT
Start: 2023-09-07 | End: 2023-09-07 | Stop reason: SURG

## 2023-09-07 RX ORDER — CEFAZOLIN SODIUM/WATER 2 G/20 ML
2 SYRINGE (ML) INTRAVENOUS ONCE
Status: COMPLETED | OUTPATIENT
Start: 2023-09-07 | End: 2023-09-07

## 2023-09-07 RX ADMIN — KETOROLAC TROMETHAMINE 30 MG: 30 INJECTION, SOLUTION INTRAMUSCULAR; INTRAVENOUS at 13:40:00

## 2023-09-07 RX ADMIN — ROCURONIUM BROMIDE 20 MG: 10 INJECTION, SOLUTION INTRAVENOUS at 12:52:00

## 2023-09-07 RX ADMIN — SODIUM CHLORIDE, SODIUM LACTATE, POTASSIUM CHLORIDE, CALCIUM CHLORIDE: 600; 310; 30; 20 INJECTION, SOLUTION INTRAVENOUS at 13:48:00

## 2023-09-07 RX ADMIN — CEFAZOLIN SODIUM/WATER 2 G: 2 G/20 ML SYRINGE (ML) INTRAVENOUS at 12:02:00

## 2023-09-07 RX ADMIN — DEXAMETHASONE SODIUM PHOSPHATE 4 MG: 4 MG/ML VIAL (ML) INJECTION at 11:55:00

## 2023-09-07 RX ADMIN — ROCURONIUM BROMIDE 50 MG: 10 INJECTION, SOLUTION INTRAVENOUS at 11:55:00

## 2023-09-07 RX ADMIN — LIDOCAINE HYDROCHLORIDE 50 MG: 10 INJECTION, SOLUTION EPIDURAL; INFILTRATION; INTRACAUDAL; PERINEURAL at 11:55:00

## 2023-09-07 RX ADMIN — SODIUM CHLORIDE, SODIUM LACTATE, POTASSIUM CHLORIDE, CALCIUM CHLORIDE: 600; 310; 30; 20 INJECTION, SOLUTION INTRAVENOUS at 11:54:00

## 2023-09-07 RX ADMIN — KETAMINE HYDROCHLORIDE 30 MG: 50 INJECTION, SOLUTION, CONCENTRATE INTRAMUSCULAR; INTRAVENOUS at 12:09:00

## 2023-09-07 RX ADMIN — ONDANSETRON 4 MG: 2 INJECTION INTRAMUSCULAR; INTRAVENOUS at 13:30:00

## 2023-09-07 RX ADMIN — MIDAZOLAM HYDROCHLORIDE 2 MG: 1 INJECTION INTRAMUSCULAR; INTRAVENOUS at 11:53:00

## 2023-09-07 NOTE — H&P (VIEW-ONLY)
Julienne Carpenter is a 30year old  female. Patient presents with:  Consult: Umbilical Hernia      HPI:    The patient complains of periumbilical/ventral hernia. Signs and symptoms of hernia were first noticed 6 months. Aggravating factors include lifting her child  . Associated symptoms include bulge. Prior imaging was ct scan. Prior treatment has included none. The patient had a colonoscopy 6/2022. Patient has 3 children  She is not having anymore children. CT scan of the abdomen pelvis 2/15/2022:  Impression     CONCLUSION:   1. No acute CT abnormality to account for patient's symptoms. 2.  Tiny fat containing umbilical hernia. Allergies:     Gluten Meal             DIARRHEA  Minocycline             HIVES  Tetracycline            HIVES  Bananas                 UNKNOWN    Comment:From allergy testing   Current Meds:         Current Outpatient Medications   Medication Sig Dispense Refill    Probiotic Product (ALIGN) 4 MG Oral Cap Take 1 capsule by mouth daily. albuterol (VENTOLIN HFA) 108 (90 Base) MCG/ACT Inhalation Aero Soln Inhale 2 puffs into the lungs every 4 (four) hours as needed for Wheezing. (Patient not taking: Reported on 8/4/2023) 1 each 0    PEPPERMINT OIL OR Take by mouth. (Patient not taking: Reported on 8/4/2023)        albuterol (PROAIR HFA) 108 (90 Base) MCG/ACT Inhalation Aero Soln Inhale 2 puffs into the lungs every 6 (six) hours as needed for Wheezing. (Patient not taking: Reported on 8/4/2023) 1 each 0         HISTORY:       Past Medical History:   Diagnosis Date    Acne      Anxiety state, unspecified      Asthma      Eczema      Extrinsic asthma, unspecified      Lipid screening 03/22/2012    Meningitis 11/2017     Pt reported            Past Surgical History:   Procedure Laterality Date    ADENOIDECTOMY   12/1997     dR. Aburto    EGD N/A 06/07/2022     Procedure: ESOPHAGOGASTRODUODENOSCOPY with bxs COLONOSCOPY w/ bxs;  Surgeon: Serenity Dunn MD;  Location: [facility] [address]       2017       01/01/2016     x 3            Family History   Problem Relation Age of Onset    Thyroid disease Mother      Thyroid disease Other      Other (Other) Other           Crohns    Cancer Paternal Grandmother        Social History    Tobacco Use      Smoking status: Never      Smokeless tobacco: Never    Vaping Use      Vaping Use: Never used    Alcohol use: Yes      Comment: occ    Drug use: Yes      Types: Cannabis      Comment: edibles occ         ROS:   HEENT: denies nasal congestion, sinus pain or sore throat  RESPIRATORY: denies shortness of breath, wheezing or cough   CARDIOVASCULAR: denies chest pain or BAÑUELOS; no palpitations   GI: denies nausea, vomiting, constipation, diarrhea; no rectal bleeding  GENITAL/: dysuria - none; nocturia - none  MUSCULOSKELETAL: no joint complaints upper or lower extremities  NEURO: no sensory or motor complaint  PSYCHE: no symptoms of depression or anxiety  HEMATOLOGY: none bruising or excessive bleeding; anticoagulants: none  ENDOCRINE: denies excessive thirst or urination; denies unexpected wt gain or wt loss     PHYSICAL EXAM:   GENERAL: well developed, well nourished, in no apparent distress  SKIN: no rashes, no suspicious lesions  HEENT: PERRLA, EOMI, anicteric, conjunctiva normal; no JVD, no TMJ   RESPIRATORY: clear to percussion and auscultation  CARDIOVASCULAR: normal, RRR; good peripheral perfusion  ABDOMEN: soft, nontender; no HSM; no masses; 2- 3cm periumbilical/ventral incarcerated hernia present; no evidence of inguinal hernia present   GENITAL/: normal external genitalia  LYMPHATIC: no lymphadenopathy  EXTREMITIES: no cyanosis, clubbing or edema, peripheral pulses intact  NEUROLOGIC: intact; no sensorimotor deficit; reflexes normal     ASSESSMENT/ PLAN:   This is a 30year old  female who has a an incarcerated ventral hernia hernia.   I had a lengthy discussion with the patient as to the etiology of incarcerated ventral  hernias, as well as treatment options. I discussed the risk of nonoperative management including the low risk of incarceration statistically. I review the education booklet with the patient on hernias. I discussed with the patient open versus laparoscopic versus robotic assisted ventral hernia repair in detail. I discussed the risks of the procedure  including but not limited to bleeding, infection, wound infection, infected mesh, recurrent hernia even if mesh is used, post operative hematoma, paraesthesia or chronic pain numbness, Perforated hollow viscus, vascular injury, need to convert to an open procedure, or anesthesia including myocardial infarction, respiratory failure, renal failure, stroke, pulmonary tingling involving the repair region, post operative activities and limitations;  as well as the risks of  embolism, deep vein thrombosis, and even death were discussed in detail with the patient who understands, consents, and wishes to proceed with the operation. Will plan Robotic-assisted repair of incarcerated ventral hernia with mesh under general anesthesia At [facility] on Thursday, September 7, 2023. Patient's primary care physician is at [facility] therefore surgery needs to be completed at [facility].  .   Total time spent in direct patient contact and decision-making  And coordinating the patient's care including greater than 50% face-to-face was  60  minutes.      Claude Moya MD

## 2023-09-07 NOTE — CONSULTS
Karlene Vanegas is a 30year old  female. Patient presents with:  Consult: Umbilical Hernia      HPI:    The patient complains of periumbilical/ventral hernia. Signs and symptoms of hernia were first noticed 6 months. Aggravating factors include lifting her child  . Associated symptoms include bulge. Prior imaging was ct scan. Prior treatment has included none. The patient had a colonoscopy 6/2022. Patient has 3 children  She is not having anymore children. CT scan of the abdomen pelvis 2/15/2022:  Impression     CONCLUSION:   1. No acute CT abnormality to account for patient's symptoms. 2.  Tiny fat containing umbilical hernia. Allergies:     Gluten Meal             DIARRHEA  Minocycline             HIVES  Tetracycline            HIVES  Bananas                 UNKNOWN    Comment:From allergy testing   Current Meds:         Current Outpatient Medications   Medication Sig Dispense Refill    Probiotic Product (ALIGN) 4 MG Oral Cap Take 1 capsule by mouth daily. albuterol (VENTOLIN HFA) 108 (90 Base) MCG/ACT Inhalation Aero Soln Inhale 2 puffs into the lungs every 4 (four) hours as needed for Wheezing. (Patient not taking: Reported on 8/4/2023) 1 each 0    PEPPERMINT OIL OR Take by mouth. (Patient not taking: Reported on 8/4/2023)        albuterol (PROAIR HFA) 108 (90 Base) MCG/ACT Inhalation Aero Soln Inhale 2 puffs into the lungs every 6 (six) hours as needed for Wheezing. (Patient not taking: Reported on 8/4/2023) 1 each 0         HISTORY:       Past Medical History:   Diagnosis Date    Acne      Anxiety state, unspecified      Asthma      Eczema      Extrinsic asthma, unspecified      Lipid screening 03/22/2012    Meningitis 11/2017     Pt reported            Past Surgical History:   Procedure Laterality Date    ADENOIDECTOMY   12/1997     dR. Aburto    EGD N/A 06/07/2022     Procedure: ESOPHAGOGASTRODUODENOSCOPY with bxs COLONOSCOPY w/ bxs;  Surgeon: Darius Ozuna MD;  Location: [facility] [address]       2017       01/01/2016     x 3            Family History   Problem Relation Age of Onset    Thyroid disease Mother      Thyroid disease Other      Other (Other) Other           Crohns    Cancer Paternal Grandmother        Social History    Tobacco Use      Smoking status: Never      Smokeless tobacco: Never    Vaping Use      Vaping Use: Never used    Alcohol use: Yes      Comment: occ    Drug use: Yes      Types: Cannabis      Comment: edibles occ         ROS:   HEENT: denies nasal congestion, sinus pain or sore throat  RESPIRATORY: denies shortness of breath, wheezing or cough   CARDIOVASCULAR: denies chest pain or BAÑUELOS; no palpitations   GI: denies nausea, vomiting, constipation, diarrhea; no rectal bleeding  GENITAL/: dysuria - none; nocturia - none  MUSCULOSKELETAL: no joint complaints upper or lower extremities  NEURO: no sensory or motor complaint  PSYCHE: no symptoms of depression or anxiety  HEMATOLOGY: none bruising or excessive bleeding; anticoagulants: none  ENDOCRINE: denies excessive thirst or urination; denies unexpected wt gain or wt loss     PHYSICAL EXAM:   GENERAL: well developed, well nourished, in no apparent distress  SKIN: no rashes, no suspicious lesions  HEENT: PERRLA, EOMI, anicteric, conjunctiva normal; no JVD, no TMJ   RESPIRATORY: clear to percussion and auscultation  CARDIOVASCULAR: normal, RRR; good peripheral perfusion  ABDOMEN: soft, nontender; no HSM; no masses; 2- 3cm periumbilical/ventral incarcerated hernia present; no evidence of inguinal hernia present   GENITAL/: normal external genitalia  LYMPHATIC: no lymphadenopathy  EXTREMITIES: no cyanosis, clubbing or edema, peripheral pulses intact  NEUROLOGIC: intact; no sensorimotor deficit; reflexes normal     ASSESSMENT/ PLAN:   This is a 30year old  female who has a an incarcerated ventral hernia hernia.   I had a lengthy discussion with the patient as to the etiology of incarcerated ventral  hernias, as well as treatment options. I discussed the risk of nonoperative management including the low risk of incarceration statistically. I review the education booklet with the patient on hernias. I discussed with the patient open versus laparoscopic versus robotic assisted ventral hernia repair in detail. I discussed the risks of the procedure  including but not limited to bleeding, infection, wound infection, infected mesh, recurrent hernia even if mesh is used, post operative hematoma, paraesthesia or chronic pain numbness, Perforated hollow viscus, vascular injury, need to convert to an open procedure, or anesthesia including myocardial infarction, respiratory failure, renal failure, stroke, pulmonary tingling involving the repair region, post operative activities and limitations;  as well as the risks of  embolism, deep vein thrombosis, and even death were discussed in detail with the patient who understands, consents, and wishes to proceed with the operation. Will plan Robotic-assisted repair of incarcerated ventral hernia with mesh under general anesthesia At [facility] on Thursday, September 7, 2023. Patient's primary care physician is at [facility] therefore surgery needs to be completed at [facility].  .   Total time spent in direct patient contact and decision-making  And coordinating the patient's care including greater than 50% face-to-face was  60  minutes.      Frankie Edward MD

## 2023-09-07 NOTE — INTERVAL H&P NOTE
Pre-op Diagnosis: Incarcerated ventral hernia    The above referenced H&P was reviewed by Jaquelin Arciniega MD on 9/7/2023, the patient was examined and no significant changes have occurred in the patient's condition since the H&P was performed. I discussed with the patient and/or legal representative the potential benefits, risks and side effects of this procedure; the likelihood of the patient achieving goals; and potential problems that might occur during recuperation. I discussed reasonable alternatives to the procedure, including risks, benefits and side effects related to the alternatives and risks related to not receiving this procedure. We will proceed with procedure as planned.

## 2023-09-07 NOTE — OPERATIVE REPORT
[facility]:     PATIENT NAME: Angela Miles  : 1985   MRN: [id number]  SITE: [address] [place] [address] B:   2023    PREOPERATIVE DIAGNOSIS: chronically incarcerated    ventral hernia      POSTOPERATIVE DIAGNOSIS: chronically incarcerated    ventral hernia        PROCEDURE PERFORMED:   robotic  repair of  chronically incarcerated    ventral hernia with mesh 3.5 cm fascial defect         SURGEON:  Howard Mcdaniel MD    ASST:  STEFFANY Guillen (Assistant helped position patient and helped with positioning, retraction, suturing, closure, dressings etc.)    ANESTHESIA: General anesthesia with endotracheal tube    ESTIMATED BLOOD LOSS:   4 ml    COMPLICATIONS: none    INDICATIONS:   This is a 41year old female who presents with a chronically incarcerated    ventral hernia  ,which is symptomatic. I had a lengthy discussion with the patient as to the etiology of the above. I discussed options of continued observation versus surgical repair. Open versus laparoscopic versus robotic repair were discussed in extensive detail. The risks of the procedure including bleeding, infection, postoperative hematoma, wound infection, nonhealing wound, scar formation, recurrent hernia if mesh is used, infected mesh, chronic pain, numbness, or tingling or seroma formation overlying the repair site, perforated hollow viscus, vascular injury, need to convert to an open procedure, as well as risks with anesthesia including myocardial infarction, respiratory failure, renal failure, pulmonary embolus, DVT and even death were all discussed in detail with the patient who understands, consents, and wishes to proceed with the operation. OPERATION:   The patient was brought to the operating room and placed on the operating table in the supine position. General anesthetic was administered by  endotracheal tube  by anesthesia.   The patient's stomach was decompressed with an Kailee Funes MD is present. Former patient of Dr Perales.     SUBJECTIVE  Thanh Stafford is a 66 year old year old male who comes in today for concerns of their cloudy urine.  Patient notes over the last few months he has been having worsening symptoms including a weakening of his stream  He notes in the middle of the night around 2:00 a.m. his stream decreases to just dribbles and he typically performs CIC.  Patient notes occasional dysuria and bladder pressure.  His daytime frequency has increased and he describes occasional urgency without incontinence.  Patient denies gross hematuria fevers or chills.      He completed a one-month course of Bactrim DS during the month of May.  He reports shortly after completing antibiotics his symptoms have returned.  In March 2021 the patient was also treated with a 1 one-week course of ampicillin.  Patient continues on tamsulosin as prescribed.  Patient also uses intermittent Cialis 5 mg. Patient feels since his 2nd Urolift he has needed several courses of antibiotics.  I see no cystoscopy following his 2nd urolith procedure.    Patient has a history of Urolift placement x2 on 11/01/2019 and 08/30/2019.  Patient has history of prostatitis.  Patient has history of elevated PSAs previous high of 19 - most recent PSA was 14.5 on 10/22/2020.  Patient with negative prostate biopsy approximately 10 years ago.  Patient underwent prostate MRI on 03/26/2019 at [facility] with the following findings in Care everywhere  Findings consistent with BPH. No findings suspicious for prostate carcinoma.    Prostate volume by MRI was 133 g.       OBJECTIVE  Blood pressure 130/80, height 5' 11\" (1.803 m), weight 86 kg..  Gen - Patient is a 66 year old year old male.    He is well developed well nourished. Nontoxic appearing.  Neuro - AOx3, answers questions appropriately.  Skin - Warm, dry.  Exposed areas without rash.  Back - Patient walks with normal gait. No obvious kyphosis or scoliosis.  Abd  - nondistended.     LABS/IMAGING  UA in office today:  Office Visit on 06/17/2021   Component Date Value   • POCT Color 06/17/2021 Yellow    • POCT Appearance 06/17/2021 Clear    • POCT Glucose Urine 06/17/2021 Negative    • POCT Bilirubin 06/17/2021 Negative    • POCT Ketones 06/17/2021 Negative    • POCT Specific Gravity 06/17/2021 1.025    • POCT Occult Blood 06/17/2021 Trace - Intact*   • POCT pH 06/17/2021 7.0    • POCT Protein 06/17/2021 30 mg/dL    • POCT Urobilinogen 06/17/2021 0.2    • Urine Nitrite 06/17/2021 Negative    • WBC (Leukocyte) Esterase* 06/17/2021 Large*   • BLDR Scan mLs 06/17/2021 176 cc        PVR   Results for orders placed or performed in visit on 06/17/21   POST VOID RES URINE/BL BY US   Result Value Ref Range    BLDR Scan mLs 176 cc        Urine sample is sent to [facility] for microscopy and reflex culture due to leukocyte esterace and blood found on dipstick.    Assessment:   1. pyuria  2. BPH - s/p urolift x 2  3. Hx of prostatitis  4. Hx of elevated PSA - negative bx and negative MRI    Plan:   Urine will be sent for micro and culture.  Patient has remaining doxycycline.  He will begin 1 tab twice daily until culture results are available on Monday.  I reviewed patient with Dr. Nagy.  He agrees with antibiotic coverage based on culture results.  Given been recurrent infection in symptoms the returned soon after discontinuing antibiotics would recommend prescription be extended to cover patient until follow-up appointment 07/15/2021 with Dr. Nagy for likely cystoscopy.    Berta Tamez PA-C   orogastric tube. The bladder was compressed with a Swain catheter. The abdomen was prepped and draped in routine sterile fashion. A small incision was made in the left upper quadrant. .  A Veress needle was introduced into the abdominal cavity without difficulty. Using the saline drop test, placement was confirmed and pneumoperitoneum was established to approximately 15 mmHg. Next, an 5 mm Visiport trocar  was inserted under direct visualization into the abdominal cavity without difficulty. A laparoscopic camera was inserted and exploration of all 4 quadrants revealed no evidence of bowel injury or vascular injury present. Next, 3 8 mm robotic trocars were placed, 1 in the left flank, left lower quadrant and left upper quadrant, under direct visualization without difficulty. Visualization of the abdominal wall revealed  a chronically incarcerated    ventral hernia   At this time the patient was brought in position and the robot was docked. Instruments were inserted I went to the console to perform the herniorrhaphy. The peritoneum was opened and the preperitoneal space was dissected free circumferentially superiorly inferiorly medially and laterally to the region of the hernia sac. The incarcerated ventral hernia sac was reduced revealing the fascial defect. The sac was completely excised. Dissection occurred to allow adequate placement of mesh in the preperitoneal space. The fascial defect was 3.5   cm in size  At this time the fascia was closed with #1 permanent V-Loc running suture after pneumoperitoneum was reduced to approximately 8 mmHg. The area was measured with a ruler. A piece of  Synecor IP  round  9.0 cm mesh was placed into the abdominal cavity. There was adequate overlap of approximately 5 cm on all sides. Mesh was secured up to intra-abdominal wall with the previous fascial suture.   The mesh was then secured to the anterior abdominal wall with #  1 permanent V-Loc running suture multiple short segments. There was no undue tension on the mesh. The peritoneum was then closed using #2-0 90-day V-Loc running suture covering the mesh in its entirety. Visualization revealed no other abnormalities present. Instruments were removed the robot was undocked I went back to the patient bedside and performed bilateral tap block with quarter percent Marcaine solution under direct visualization without difficulty. Remain pneumoperitoneum was removed and the 8 and 5 mm trocar sites were removed under physician with no impingement of bowel or bleeding present. The wounds were irrigated thoroughly with saline solution. The skin was approximated with 4-0 Vicryl subcuticular suture. Mastisol and Steri-Strips were applied to the wound. Sterile dressings applied to the wound. A sterile pressure dressing was applied to the ventral hernia repair site. The patient was transferred off the operative table, to the recovery room, extubated, in stable condition. All counts were correct at the end of the case.        [address] [facility]    9/7/2023  1:22 PM  Yang Mead MD

## 2023-09-07 NOTE — ANESTHESIA PROCEDURE NOTES
Airway  Date/Time: 9/7/2023 11:57 AM  Urgency: Elective    Airway not difficult    General Information and Staff    Patient location during procedure: OR  Anesthesiologist: Khari Tejeda MD  Resident/CRNA: Sarahi Ingram CRNA  Performed: CRNA   Performed by: Sarahi Ingram CRNA  Authorized by: Khari Tejeda MD      Indications and Patient Condition  Indications for airway management: anesthesia  Sedation level: deep  Preoxygenated: yes  Patient position: sniffing  Mask difficulty assessment: 1 - vent by mask    Final Airway Details  Final airway type: endotracheal airway      Successful airway: ETT  Cuffed: yes   Successful intubation technique: Video laryngoscopy  Endotracheal tube insertion site: oral  Blade: Tila  Blade size: #3  ETT size (mm): 7.0    Cormack-Lehane Classification: grade I - full view of glottis  Placement verified by: capnometry   Cuff volume (mL): 8  Measured from: lips  ETT to lips (cm): 20  Number of attempts at approach: 1  Number of other approaches attempted: 0

## 2023-09-07 NOTE — BRIEF OP NOTE
Pre-Operative Diagnosis: Incarcerated ventral hernia     Post-Operative Diagnosis: The same     Procedure Performed:   Robotic repair of incarcerated ventral hernia with mesh,    Surgeon(s) and Role:     Capri Valencia MD - Primary    Assistant(s):  PA: STEFFANY Willard     Surgical Findings: Incarcerated ventral hernia     Specimen: None     Estimated Blood Loss: 4 mL  Dictation Number:      Howard Mcdaniel MD  9/7/2023  1:21 PM

## 2024-06-16 ENCOUNTER — WALK IN (OUTPATIENT)
Dept: URGENT CARE | Age: 39
End: 2024-06-16

## 2024-06-16 VITALS
DIASTOLIC BLOOD PRESSURE: 82 MMHG | WEIGHT: 128 LBS | SYSTOLIC BLOOD PRESSURE: 119 MMHG | RESPIRATION RATE: 16 BRPM | TEMPERATURE: 98.1 F | HEART RATE: 75 BPM | OXYGEN SATURATION: 100 %

## 2024-06-16 DIAGNOSIS — H00.015 HORDEOLUM EXTERNUM OF LEFT LOWER EYELID: Primary | ICD-10-CM

## 2024-06-16 PROBLEM — K42.9 UMBILICAL HERNIA WITHOUT OBSTRUCTION AND WITHOUT GANGRENE: Status: ACTIVE | Noted: 2023-02-21

## 2024-06-16 PROBLEM — M79.7 FIBROMYALGIA: Status: ACTIVE | Noted: 2019-06-28

## 2024-06-16 PROBLEM — K90.41 GLUTEN INTOLERANCE: Status: ACTIVE | Noted: 2019-12-04

## 2024-06-16 PROBLEM — G25.0 ESSENTIAL TREMOR: Status: ACTIVE | Noted: 2019-12-04

## 2024-06-16 PROBLEM — K43.6 VENTRAL HERNIA WITH OBSTRUCTION AND WITHOUT GANGRENE: Status: ACTIVE | Noted: 2023-08-04

## 2024-06-16 PROCEDURE — 99203 OFFICE O/P NEW LOW 30 MIN: CPT | Performed by: NURSE PRACTITIONER

## 2024-06-16 RX ORDER — ERYTHROMYCIN 5 MG/G
OINTMENT OPHTHALMIC
Qty: 3.5 G | Refills: 0 | Status: SHIPPED | OUTPATIENT
Start: 2024-06-16 | End: 2024-06-23

## 2024-06-16 ASSESSMENT — ENCOUNTER SYMPTOMS
EYE ITCHING: 0
EYE REDNESS: 1
FOREIGN BODY SENSATION: 0
VOMITING: 0
NAUSEA: 0
BLURRED VISION: 0
EYE DISCHARGE: 0
FEVER: 0
PHOTOPHOBIA: 0
DOUBLE VISION: 0

## (undated) DEVICE — VLOC

## (undated) DEVICE — 3M™ TEGADERM™ TRANSPARENT FILM DRESSING, 1626W, 4 IN X 4-3/4 IN (10 CM X 12 CM), 50 EACH/CARTON, 4 CARTON/CASE: Brand: 3M™ TEGADERM™

## (undated) DEVICE — TROCAR: Brand: KII FIOS FIRST ENTRY

## (undated) DEVICE — TIP COVER ACCESSORY

## (undated) DEVICE — COLUMN DRAPE

## (undated) DEVICE — LAPAROVUE VISIBILITY SYSTEM LAPAROSCOPIC SOLUTIONS: Brand: LAPAROVUE

## (undated) DEVICE — CANNULA SEAL

## (undated) DEVICE — C-ARM: Brand: UNBRANDED

## (undated) DEVICE — 3M™ IOBAN™ 2 ANTIMICROBIAL INCISE DRAPE 6650EZ: Brand: IOBAN™ 2

## (undated) DEVICE — ARM DRAPE

## (undated) DEVICE — DRAPE SHEET LAPCHOLE 124X100X7

## (undated) DEVICE — SUTURE VLOC 90 2-0 9" 2145

## (undated) DEVICE — TUBING MEGADYNE LAPAROSCOPIC

## (undated) DEVICE — MONOPOLAR CURVED SCISSORS: Brand: ENDOWRIST

## (undated) DEVICE — GOWN SURG AERO BLUE PERF LG

## (undated) DEVICE — SUT SILK 0 SH K834H

## (undated) DEVICE — PBT NON ABSORBABLE WOUND CLOSURE DEVICE: Brand: V-LOC

## (undated) DEVICE — ADHESIVE MASTISOL 2/3ML

## (undated) DEVICE — SOL NACL IRRIG 0.9% 1000ML BTL

## (undated) DEVICE — ROBOTIC: Brand: MEDLINE INDUSTRIES, INC.

## (undated) DEVICE — PROGRASP FORCEPS: Brand: ENDOWRIST

## (undated) DEVICE — GAMMEX® PI HYBRID SIZE 7.5, STERILE POWDER-FREE SURGICAL GLOVE, POLYISOPRENE AND NEOPRENE BLEND: Brand: GAMMEX

## (undated) DEVICE — MEGA SUTURECUT ND: Brand: ENDOWRIST

## (undated) DEVICE — INSUFFLATION NEEDLE TO ESTABLISH PNEUMOPERITONEUM.: Brand: INSUFFLATION NEEDLE

## (undated) DEVICE — BLADELESS OBTURATOR: Brand: WECK VISTA

## (undated) DEVICE — UNDYED BRAIDED (POLYGLACTIN 910), SYNTHETIC ABSORBABLE SUTURE: Brand: COATED VICRYL

## (undated) NOTE — LETTER
06/02/20      [address] [place] [id number]      Dear Marquise Osuna,    [facility] records indicate that you have outstanding lab work and or testing that was ordered for you and has not yet been completed:  Orders Placed This Encounter      C

## (undated) NOTE — ED AVS SNAPSHOT
Ms. Charles Colin   MRN: [id number]    Department:  [facility] Emergency Department   Date of Visit:  4/17/2018           Disclosure     Insurance plans vary and the physician(s) referred by the ER may not be covered by your plan.  Please contact CARE PHYSICIAN AT ONCE OR RETURN IMMEDIATELY TO THE EMERGENCY DEPARTMENT. If you have been prescribed any medication(s), please fill your prescription right away and begin taking the medication(s) as directed.   If you believe that any of the medications

## (undated) NOTE — LETTER
[facility]  Administration of Anesthesia  1. Dhaval Delcid Ni, or _________________________________ acting on her behalf, (Patient) (Dependent/Representative) request to receive anesthesia for my pending procedure/operation/treatment.   A shastay bleeding, seizure, cardiac arrest and death. 7. AWARENESS: I understand that it is possible (but unlikely) to have explicit memory of events from the operating room while under general anesthesia.   8. ELECTROCONVULSIVE THERAPY PATIENTS: This consent serve below affirms that prior to the time of the procedure, I have explained to the patient and/or his/her guardian, the risks and benefits of undergoing anesthesia, as well as any reasonable alternatives.     ___________________________________________________

## (undated) NOTE — LETTER
[facility]  Administration of Anesthesia  1. David Aguilar Ni, or _________________________________ acting on her behalf, (Patient) (Dependent/Representative) request to receive anesthesia for my pending procedure/operation/treatment.   A evy bleeding, seizure, cardiac arrest and death. 7. AWARENESS: I understand that it is possible (but unlikely) to have explicit memory of events from the operating room while under general anesthesia.   8. ELECTROCONVULSIVE THERAPY PATIENTS: This consent serve below affirms that prior to the time of the procedure, I have explained to the patient and/or his/her guardian, the risks and benefits of undergoing anesthesia, as well as any reasonable alternatives.     ___________________________________________________

## (undated) NOTE — ED AVS SNAPSHOT
Ms. Gatica [facility]   MRN: [id number]    Department:  [facility] Emergency Department   Date of Visit:  4/28/2018           Disclosure     Insurance plans vary and the physician(s) referred by the ER may not be covered by your plan.  Please contact CARE PHYSICIAN AT ONCE OR RETURN IMMEDIATELY TO THE EMERGENCY DEPARTMENT. If you have been prescribed any medication(s), please fill your prescription right away and begin taking the medication(s) as directed.   If you believe that any of the medications

## (undated) NOTE — LETTER
08/12/20        [address] [facility] [id number]      Dear Olga Gannon,    [address] [facility] records indicate that you have outstanding lab work and or testing that was ordered for you and has not yet been completed:  Orders Placed This Encounter

## (undated) NOTE — MR AVS SNAPSHOT
After Visit Summary   1/30/2020    Nakul Lin    MRN: [id number]           Visit Information     Date & Time  1/30/2020  3:30 PM Provider  Ignacio Sherwood MD [address] [address] Dept.  Phone  [phone] Diagnoses for This Visit    Screening for cervical cancer   [111657]  -  Primary  Mild intermittent asthma without complication   [304151]             We Ordered the Following     Normal Orders This Visit    THINPREP PAP WITH HPV REFLEX REQUEST B [[id number] [facility]  [address]  Monday – Friday  8:00 am – 8:00 pm   Saturday – Sunday  8:00 am – 4:00 pm    WALK-IN CARE  Primary Care Providers  Treatment for acute illness   or injury that are   non-life-threatening.   Also available b